# Patient Record
Sex: MALE | Race: BLACK OR AFRICAN AMERICAN | NOT HISPANIC OR LATINO | Employment: FULL TIME | URBAN - METROPOLITAN AREA
[De-identification: names, ages, dates, MRNs, and addresses within clinical notes are randomized per-mention and may not be internally consistent; named-entity substitution may affect disease eponyms.]

---

## 2015-03-19 LAB — CRC RECOMMENDATION EXT: NORMAL

## 2022-05-13 LAB
CHOLEST SERPL-MSCNC: 182 MG/DL (ref 0–200)
HDLC SERPL-MCNC: 64 MG/DL
LDLC SERPL CALC-MCNC: 96 MG/DL
PSA: 3.39
TRIGL SERPL-MCNC: 74 MG/DL

## 2023-07-06 ENCOUNTER — OFFICE VISIT (OUTPATIENT)
Dept: PRIMARY CARE CLINIC | Facility: CLINIC | Age: 63
End: 2023-07-06
Payer: COMMERCIAL

## 2023-07-06 VITALS
HEART RATE: 77 BPM | DIASTOLIC BLOOD PRESSURE: 70 MMHG | BODY MASS INDEX: 23.91 KG/M2 | SYSTOLIC BLOOD PRESSURE: 120 MMHG | WEIGHT: 167 LBS | HEIGHT: 70 IN

## 2023-07-06 DIAGNOSIS — Z11.3 SCREEN FOR STD (SEXUALLY TRANSMITTED DISEASE): ICD-10-CM

## 2023-07-06 DIAGNOSIS — Z12.5 SPECIAL SCREENING FOR MALIGNANT NEOPLASM OF PROSTATE: Primary | ICD-10-CM

## 2023-07-06 DIAGNOSIS — I10 ESSENTIAL HYPERTENSION, BENIGN: ICD-10-CM

## 2023-07-06 DIAGNOSIS — Z00.00 PREVENTATIVE HEALTH CARE: ICD-10-CM

## 2023-07-06 DIAGNOSIS — E78.2 MIXED HYPERLIPIDEMIA: ICD-10-CM

## 2023-07-06 DIAGNOSIS — M1A.0710 IDIOPATHIC CHRONIC GOUT OF RIGHT FOOT WITHOUT TOPHUS: ICD-10-CM

## 2023-07-06 DIAGNOSIS — Z11.59 ENCOUNTER FOR HEPATITIS C SCREENING TEST FOR LOW RISK PATIENT: ICD-10-CM

## 2023-07-06 PROCEDURE — 3074F SYST BP LT 130 MM HG: CPT | Mod: CPTII,S$GLB,, | Performed by: INTERNAL MEDICINE

## 2023-07-06 PROCEDURE — 3078F PR MOST RECENT DIASTOLIC BLOOD PRESSURE < 80 MM HG: ICD-10-PCS | Mod: CPTII,S$GLB,, | Performed by: INTERNAL MEDICINE

## 2023-07-06 PROCEDURE — 4010F ACE/ARB THERAPY RXD/TAKEN: CPT | Mod: CPTII,S$GLB,, | Performed by: INTERNAL MEDICINE

## 2023-07-06 PROCEDURE — 1160F PR REVIEW ALL MEDS BY PRESCRIBER/CLIN PHARMACIST DOCUMENTED: ICD-10-PCS | Mod: CPTII,S$GLB,, | Performed by: INTERNAL MEDICINE

## 2023-07-06 PROCEDURE — 3078F DIAST BP <80 MM HG: CPT | Mod: CPTII,S$GLB,, | Performed by: INTERNAL MEDICINE

## 2023-07-06 PROCEDURE — 1159F PR MEDICATION LIST DOCUMENTED IN MEDICAL RECORD: ICD-10-PCS | Mod: CPTII,S$GLB,, | Performed by: INTERNAL MEDICINE

## 2023-07-06 PROCEDURE — 3008F BODY MASS INDEX DOCD: CPT | Mod: CPTII,S$GLB,, | Performed by: INTERNAL MEDICINE

## 2023-07-06 PROCEDURE — 99214 PR OFFICE/OUTPT VISIT, EST, LEVL IV, 30-39 MIN: ICD-10-PCS | Mod: S$GLB,,, | Performed by: INTERNAL MEDICINE

## 2023-07-06 PROCEDURE — 4010F PR ACE/ARB THEARPY RXD/TAKEN: ICD-10-PCS | Mod: CPTII,S$GLB,, | Performed by: INTERNAL MEDICINE

## 2023-07-06 PROCEDURE — 1159F MED LIST DOCD IN RCRD: CPT | Mod: CPTII,S$GLB,, | Performed by: INTERNAL MEDICINE

## 2023-07-06 PROCEDURE — 3074F PR MOST RECENT SYSTOLIC BLOOD PRESSURE < 130 MM HG: ICD-10-PCS | Mod: CPTII,S$GLB,, | Performed by: INTERNAL MEDICINE

## 2023-07-06 PROCEDURE — 99214 OFFICE O/P EST MOD 30 MIN: CPT | Mod: S$GLB,,, | Performed by: INTERNAL MEDICINE

## 2023-07-06 PROCEDURE — 1160F RVW MEDS BY RX/DR IN RCRD: CPT | Mod: CPTII,S$GLB,, | Performed by: INTERNAL MEDICINE

## 2023-07-06 PROCEDURE — 3008F PR BODY MASS INDEX (BMI) DOCUMENTED: ICD-10-PCS | Mod: CPTII,S$GLB,, | Performed by: INTERNAL MEDICINE

## 2023-07-06 RX ORDER — MELOXICAM 15 MG/1
15 TABLET ORAL DAILY
Qty: 30 TABLET | Refills: 2 | Status: SHIPPED | OUTPATIENT
Start: 2023-07-06 | End: 2023-10-04

## 2023-07-06 RX ORDER — MELOXICAM 15 MG/1
15 TABLET ORAL DAILY PRN
COMMUNITY
Start: 2023-05-11 | End: 2023-07-06 | Stop reason: SDUPTHER

## 2023-07-06 RX ORDER — ALLOPURINOL 100 MG/1
100 TABLET ORAL DAILY
COMMUNITY
Start: 2023-03-02 | End: 2023-07-06

## 2023-07-06 RX ORDER — ATORVASTATIN CALCIUM 20 MG/1
20 TABLET, FILM COATED ORAL DAILY
COMMUNITY
Start: 2023-03-15 | End: 2024-03-05 | Stop reason: SDUPTHER

## 2023-07-06 RX ORDER — BENAZEPRIL HYDROCHLORIDE 20 MG/1
20 TABLET ORAL DAILY
COMMUNITY
Start: 2023-05-08 | End: 2024-03-05 | Stop reason: SDUPTHER

## 2023-07-06 NOTE — PROGRESS NOTES
Subjective:       Patient ID: Sandro Malone is a 62 y.o. male.    Chief Complaint: Follow-up      Patient is established already .......... presents today for a f/u visit , to discuss labs results and for management of the chronic conditions.        Follow-up  Pertinent negatives include no fever.   Hypertension  This is a chronic problem. The current episode started more than 1 year ago. The problem has been waxing and waning since onset. The problem is uncontrolled. There are no associated agents to hypertension. Risk factors for coronary artery disease include dyslipidemia, family history, obesity and sedentary lifestyle. Past treatments include calcium channel blockers and ACE inhibitors. The current treatment provides significant improvement. Compliance problems include psychosocial issues, diet and exercise.    Review of Systems   Constitutional:  Negative for activity change, appetite change and fever.   Respiratory: Negative.     Cardiovascular: Negative.    Gastrointestinal: Negative.    Musculoskeletal: Negative.        Objective:      Physical Exam  Vitals and nursing note reviewed.   Constitutional:       Appearance: Normal appearance. He is obese.   Cardiovascular:      Rate and Rhythm: Normal rate and regular rhythm.      Pulses: Normal pulses.      Heart sounds: Normal heart sounds. No murmur heard.    No friction rub. No gallop.   Pulmonary:      Effort: Pulmonary effort is normal.      Breath sounds: Normal breath sounds. No wheezing.   Abdominal:      General: Abdomen is flat. Bowel sounds are normal.      Palpations: Abdomen is soft.   Musculoskeletal:         General: Normal range of motion.   Skin:     General: Skin is warm and dry.   Neurological:      General: No focal deficit present.      Mental Status: He is alert and oriented to person, place, and time.   Psychiatric:         Mood and Affect: Mood normal.       Assessment:       1. Special screening for malignant neoplasm of  prostate  -     Prostate Specific Antigen, Diagnostic; Future; Expected date: 07/06/2023    2. Mixed hyperlipidemia  Overview:  Usually controlled     Assessment & Plan:  Recheck    Orders:  -     Lipid Panel; Future; Expected date: 07/06/2023    3. Essential hypertension, benign  -     Comprehensive Metabolic Panel; Future; Expected date: 07/06/2023  -     Microalbumin/Creatinine Ratio, Urine; Future; Expected date: 07/06/2023    4. Encounter for hepatitis C screening test for low risk patient  -     Hepatitis C Antibody; Future; Expected date: 07/06/2023    5. Screen for STD (sexually transmitted disease)  -     HIV 1/2 Ag/Ab (4th Gen); Future; Expected date: 01/06/2024    6. Idiopathic chronic gout of right foot without tophus  Overview:  Stable    Assessment & Plan:  Hold allopurinol and monitor uric acid      Other orders  -     meloxicam (MOBIC) 15 MG tablet; Take 1 tablet (15 mg total) by mouth once daily.  Dispense: 30 tablet; Refill: 2             Plan:       1. Special screening for malignant neoplasm of prostate  -     Prostate Specific Antigen, Diagnostic; Future; Expected date: 07/06/2023    2. Mixed hyperlipidemia  Overview:  Usually controlled     Assessment & Plan:  Recheck    Orders:  -     Lipid Panel; Future; Expected date: 07/06/2023    3. Essential hypertension, benign  -     Comprehensive Metabolic Panel; Future; Expected date: 07/06/2023  -     Microalbumin/Creatinine Ratio, Urine; Future; Expected date: 07/06/2023    4. Encounter for hepatitis C screening test for low risk patient  -     Hepatitis C Antibody; Future; Expected date: 07/06/2023    5. Screen for STD (sexually transmitted disease)  -     HIV 1/2 Ag/Ab (4th Gen); Future; Expected date: 01/06/2024    6. Idiopathic chronic gout of right foot without tophus  Overview:  Stable    Assessment & Plan:  Hold allopurinol and monitor uric acid      Other orders  -     meloxicam (MOBIC) 15 MG tablet; Take 1 tablet (15 mg total) by mouth once  daily.  Dispense: 30 tablet; Refill: 2

## 2023-07-07 ENCOUNTER — PATIENT OUTREACH (OUTPATIENT)
Dept: ADMINISTRATIVE | Facility: HOSPITAL | Age: 63
End: 2023-07-07
Payer: COMMERCIAL

## 2023-07-07 NOTE — PROGRESS NOTES
Population Health Chart Review & Patient Outreach Details:     Reason for Outreach Encounter:     [x]  Non-Compliant Report   []  Payor Report (Hum[employer], PHN, BCBS, MSSP, MCIP, UHC, etc.)   []  Pre-Visit Chart Review     Updates Requested / Reviewed:     []  Care Everywhere    []     []  External Sources ([facility], [facility], DIS, etc.)   [x]  Care Team Updated    Patient Outreach Method:    []  Telephone Outreach Completed   [] Successful   [] Left Voicemail   [] Unable to Contact (wrong number, no voicemail)  []  [employer] Portal Outreach Sent  []  Letter Outreach Mailed  []  Fax Sent for External Records  [x]  External Records Upload    Health Maintenance Topics Addressed and Outreach Outcomes / Actions Taken:        []      Breast Cancer Screening []  Mammo Scheduled      []  External Records Requested     []  Added Reminder to Complete to Upcoming Primary Care Appt Notes     []  Patient Declined     []  Patient Will Call Back to Schedule     []  Patient Will Schedule with External Provider / Order Routed if Applicable             []       Cervical Cancer Screening []  Pap Scheduled      []  External Records Requested     []  Added Reminder to Complete to Upcoming Primary Care Appt Notes     []  Patient Declined     []  Patient Will Call Back to Schedule     []  Patient Will Schedule with External Provider               [x]          Colorectal Cancer Screening []  Colonoscopy Case Request or Referral Placed     []  External Records Requested     []  Added Reminder to Complete to Upcoming Primary Care Appt Notes     []  Patient Declined     []  Patient Will Call Back to Schedule     []  Patient Will Schedule with External Provider     []  Fit Kit Mailed (add the SmartPhrase under additional notes)     []  Reminded Patient to Complete Home Test             []      Diabetic Eye Exam []  Eye Camera Scheduled or Optometry Referral Placed     []  External Records Requested     []  Added Reminder to Complete to  Upcoming Primary Care Appt Notes     []  Patient Declined     []  Patient Will Call Back to Schedule     []  Patient Will Schedule with External Provider             []      Blood Pressure Control []  Primary Care Follow Up Visit Scheduled     []  Remote Blood Pressure Reading Captured     []  Added Reminder to Complete to Upcoming Primary Care Appt Notes     []  Patient Declined     []  Patient Will Call Back / Patient Will Send Portal Message with Reading     []  Patient Will Call Back to Schedule Provider Visit             []       HbA1c & Other Labs []  Lab Appt Scheduled for Due Labs     []  Primary Care Follow Up Visit Scheduled      []  Reminded Patient to Complete Home Test     []  Added Reminder to Complete to Upcoming Primary Care Appt Notes     []  Patient Declined     []  Patient Will Call Back to Schedule     []  Patient Will Schedule with External Provider / Order Routed if Applicable           []    Schedule Primary Care Appt []  Primary Care Appt Scheduled     []  Patient Declined     []  Patient Will Call Back to Schedule     []  Pt Established with External Provider & Updated Care Team             []      Medication Adherence []  Primary Care Appointment Scheduled     []  Added Reminder to Upcoming Primary Care Appt Notes     []  Patient Reminded to  Prescription     []  Patient Declined, Provider Notified if Needed     []  Sent Provider Message to Review and/or Add Exclusion to Problem List             []      Osteoporosis Screening []  DXA Appointment Scheduled     []  External Records Requested     []  Added Reminder to Complete to Upcoming Primary Care Appt Notes     []  Patient Declined     []  Patient Will Call Back to Schedule     []  Patient Will Schedule with External Provider / Order Routed if Applicable     Additional Care Coordinator Notes:     Uploaded 2015 colonoscopy & 05/2022 labs    Further Action Needed If Patient Returns Outreach:

## 2023-07-28 ENCOUNTER — TELEPHONE (OUTPATIENT)
Dept: FAMILY MEDICINE | Facility: CLINIC | Age: 63
End: 2023-07-28
Payer: COMMERCIAL

## 2023-07-28 NOTE — TELEPHONE ENCOUNTER
----- Message from Roseanne Ryan sent at 7/28/2023 10:45 AM CDT -----  Contact: Patient  Type:  Needs Medical Advice    Who Called: Patient    Would the patient rather a call back or a response via [facility]? Fax    Best Call Back Number: SEE BELOW    Additional Information: patient was instructed to get labs done but no lab orders were faxed in.  [facility] lab Fax number [phone]

## 2023-08-07 ENCOUNTER — OFFICE VISIT (OUTPATIENT)
Dept: PRIMARY CARE CLINIC | Facility: CLINIC | Age: 63
End: 2023-08-07
Payer: COMMERCIAL

## 2023-08-07 VITALS
RESPIRATION RATE: 18 BRPM | OXYGEN SATURATION: 96 % | SYSTOLIC BLOOD PRESSURE: 134 MMHG | DIASTOLIC BLOOD PRESSURE: 74 MMHG | HEART RATE: 84 BPM | HEIGHT: 70 IN | TEMPERATURE: 98 F | BODY MASS INDEX: 23.91 KG/M2 | WEIGHT: 167 LBS
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
## 2023-08-07 DIAGNOSIS — T14.8XXA ABRASION: ICD-10-CM

## 2023-08-07 DIAGNOSIS — Z00.01 ENCOUNTER FOR GENERAL ADULT MEDICAL EXAMINATION WITH ABNORMAL FINDINGS: ICD-10-CM

## 2023-08-07 PROCEDURE — 3078F DIAST BP <80 MM HG: CPT | Mod: CPTII,S$GLB,, | Performed by: INTERNAL MEDICINE

## 2023-08-07 PROCEDURE — 99396 PREV VISIT EST AGE 40-64: CPT | Mod: S$GLB,,, | Performed by: INTERNAL MEDICINE

## 2023-08-07 PROCEDURE — 3075F SYST BP GE 130 - 139MM HG: CPT | Mod: CPTII,S$GLB,, | Performed by: INTERNAL MEDICINE

## 2023-08-07 PROCEDURE — 3075F PR MOST RECENT SYSTOLIC BLOOD PRESS GE 130-139MM HG: ICD-10-PCS | Mod: CPTII,S$GLB,, | Performed by: INTERNAL MEDICINE

## 2023-08-07 PROCEDURE — 3008F PR BODY MASS INDEX (BMI) DOCUMENTED: ICD-10-PCS | Mod: CPTII,S$GLB,, | Performed by: INTERNAL MEDICINE

## 2023-08-07 PROCEDURE — 1160F PR REVIEW ALL MEDS BY PRESCRIBER/CLIN PHARMACIST DOCUMENTED: ICD-10-PCS | Mod: CPTII,S$GLB,, | Performed by: INTERNAL MEDICINE

## 2023-08-07 PROCEDURE — 1160F RVW MEDS BY RX/DR IN RCRD: CPT | Mod: CPTII,S$GLB,, | Performed by: INTERNAL MEDICINE

## 2023-08-07 PROCEDURE — 4010F PR ACE/ARB THEARPY RXD/TAKEN: ICD-10-PCS | Mod: CPTII,S$GLB,, | Performed by: INTERNAL MEDICINE

## 2023-08-07 PROCEDURE — 1159F MED LIST DOCD IN RCRD: CPT | Mod: CPTII,S$GLB,, | Performed by: INTERNAL MEDICINE

## 2023-08-07 PROCEDURE — 99213 PR OFFICE/OUTPT VISIT, EST, LEVL III, 20-29 MIN: ICD-10-PCS | Mod: 25,S$GLB,, | Performed by: INTERNAL MEDICINE

## 2023-08-07 PROCEDURE — 3008F BODY MASS INDEX DOCD: CPT | Mod: CPTII,S$GLB,, | Performed by: INTERNAL MEDICINE

## 2023-08-07 PROCEDURE — 1159F PR MEDICATION LIST DOCUMENTED IN MEDICAL RECORD: ICD-10-PCS | Mod: CPTII,S$GLB,, | Performed by: INTERNAL MEDICINE

## 2023-08-07 PROCEDURE — 99213 OFFICE O/P EST LOW 20 MIN: CPT | Mod: 25,S$GLB,, | Performed by: INTERNAL MEDICINE

## 2023-08-07 PROCEDURE — 3078F PR MOST RECENT DIASTOLIC BLOOD PRESSURE < 80 MM HG: ICD-10-PCS | Mod: CPTII,S$GLB,, | Performed by: INTERNAL MEDICINE

## 2023-08-07 PROCEDURE — 99396 PR PREVENTIVE VISIT,EST,40-64: ICD-10-PCS | Mod: S$GLB,,, | Performed by: INTERNAL MEDICINE

## 2023-08-07 PROCEDURE — 4010F ACE/ARB THERAPY RXD/TAKEN: CPT | Mod: CPTII,S$GLB,, | Performed by: INTERNAL MEDICINE

## 2023-08-07 RX ORDER — ALLOPURINOL 100 MG/1
1 TABLET ORAL DAILY
COMMUNITY
End: 2024-03-05

## 2023-08-07 RX ORDER — HYDROCODONE BITARTRATE AND ACETAMINOPHEN 5; 325 MG/1; MG/1
2 TABLET ORAL
COMMUNITY
Start: 2023-08-06 | End: 2023-08-16

## 2023-08-07 RX ORDER — ALBUTEROL SULFATE 90 UG/1
AEROSOL, METERED RESPIRATORY (INHALATION)
COMMUNITY

## 2023-08-07 RX ORDER — KETOROLAC TROMETHAMINE 5 MG/ML
1 SOLUTION OPHTHALMIC 4 TIMES DAILY
COMMUNITY
Start: 2023-07-06 | End: 2023-08-24 | Stop reason: ALTCHOICE

## 2023-08-07 RX ORDER — IBUPROFEN 600 MG/1
600 TABLET ORAL 3 TIMES DAILY
Qty: 45 TABLET | Refills: 0 | Status: SHIPPED | OUTPATIENT
Start: 2023-08-07 | End: 2023-08-22

## 2023-08-07 RX ORDER — ERGOCALCIFEROL 1.25 MG/1
1 CAPSULE ORAL
COMMUNITY

## 2023-08-07 RX ORDER — AMLODIPINE BESYLATE 5 MG/1
5 TABLET ORAL
COMMUNITY
Start: 2023-07-31

## 2023-08-07 RX ORDER — SILVER SULFADIAZINE 10 G/1000G
CREAM TOPICAL 2 TIMES DAILY
Qty: 50 G | Refills: 3 | Status: SHIPPED | OUTPATIENT
Start: 2023-08-07 | End: 2023-09-04

## 2023-08-07 RX ORDER — SULFAMETHOXAZOLE AND TRIMETHOPRIM 800; 160 MG/1; MG/1
1 TABLET ORAL 2 TIMES DAILY
COMMUNITY
Start: 2023-08-06 | End: 2024-03-05
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
## 2023-08-07 NOTE — ASSESSMENT & PLAN NOTE
We applied mupirocin ointment to the superficial abrasion of his left forearm and covered that with nonadhesive pad and gauze bandages  Patient was given instructions for similar wound care  Accordingly prescription for Silvadene cream to be applied to his wounds once or twice a day  I also called him ibuprofen for pain
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
## 2023-08-17 ENCOUNTER — OFFICE VISIT (OUTPATIENT)
Dept: PRIMARY CARE CLINIC | Facility: CLINIC | Age: 63
End: 2023-08-17
Payer: COMMERCIAL

## 2023-08-17 VITALS
OXYGEN SATURATION: 98 % | SYSTOLIC BLOOD PRESSURE: 110 MMHG | HEART RATE: 91 BPM | WEIGHT: 166 LBS | DIASTOLIC BLOOD PRESSURE: 64 MMHG | BODY MASS INDEX: 23.77 KG/M2 | HEIGHT: 70 IN

## 2023-08-17 DIAGNOSIS — L03.114 CELLULITIS OF LEFT UPPER EXTREMITY: ICD-10-CM

## 2023-08-17 DIAGNOSIS — L02.419 CELLULITIS AND ABSCESS OF UPPER EXTREMITY: Primary | ICD-10-CM

## 2023-08-17 DIAGNOSIS — L03.119 CELLULITIS AND ABSCESS OF UPPER EXTREMITY: Primary | ICD-10-CM

## 2023-08-17 PROBLEM — L03.90 CELLULITIS: Status: ACTIVE | Noted: 2023-08-17

## 2023-08-17 PROCEDURE — 3008F PR BODY MASS INDEX (BMI) DOCUMENTED: ICD-10-PCS | Mod: CPTII,S$GLB,, | Performed by: INTERNAL MEDICINE

## 2023-08-17 PROCEDURE — 96372 THER/PROPH/DIAG INJ SC/IM: CPT | Mod: S$GLB,,, | Performed by: INTERNAL MEDICINE

## 2023-08-17 PROCEDURE — 1159F PR MEDICATION LIST DOCUMENTED IN MEDICAL RECORD: ICD-10-PCS | Mod: CPTII,S$GLB,, | Performed by: INTERNAL MEDICINE

## 2023-08-17 PROCEDURE — 3078F PR MOST RECENT DIASTOLIC BLOOD PRESSURE < 80 MM HG: ICD-10-PCS | Mod: CPTII,S$GLB,, | Performed by: INTERNAL MEDICINE

## 2023-08-17 PROCEDURE — 1160F RVW MEDS BY RX/DR IN RCRD: CPT | Mod: CPTII,S$GLB,, | Performed by: INTERNAL MEDICINE

## 2023-08-17 PROCEDURE — 96372 PR INJECTION,THERAP/PROPH/DIAG2ST, IM OR SUBCUT: ICD-10-PCS | Mod: S$GLB,,, | Performed by: INTERNAL MEDICINE

## 2023-08-17 PROCEDURE — 3074F PR MOST RECENT SYSTOLIC BLOOD PRESSURE < 130 MM HG: ICD-10-PCS | Mod: CPTII,S$GLB,, | Performed by: INTERNAL MEDICINE

## 2023-08-17 PROCEDURE — 1159F MED LIST DOCD IN RCRD: CPT | Mod: CPTII,S$GLB,, | Performed by: INTERNAL MEDICINE

## 2023-08-17 PROCEDURE — 3074F SYST BP LT 130 MM HG: CPT | Mod: CPTII,S$GLB,, | Performed by: INTERNAL MEDICINE

## 2023-08-17 PROCEDURE — 3078F DIAST BP <80 MM HG: CPT | Mod: CPTII,S$GLB,, | Performed by: INTERNAL MEDICINE

## 2023-08-17 PROCEDURE — 99215 PR OFFICE/OUTPT VISIT, EST, LEVL V, 40-54 MIN: ICD-10-PCS | Mod: 25,S$GLB,, | Performed by: INTERNAL MEDICINE

## 2023-08-17 PROCEDURE — 4010F ACE/ARB THERAPY RXD/TAKEN: CPT | Mod: CPTII,S$GLB,, | Performed by: INTERNAL MEDICINE

## 2023-08-17 PROCEDURE — 16025 PR DRESS/DEBRID MED BURN 2 ANESTH: ICD-10-PCS | Mod: 59,S$GLB,, | Performed by: INTERNAL MEDICINE

## 2023-08-17 PROCEDURE — 1160F PR REVIEW ALL MEDS BY PRESCRIBER/CLIN PHARMACIST DOCUMENTED: ICD-10-PCS | Mod: CPTII,S$GLB,, | Performed by: INTERNAL MEDICINE

## 2023-08-17 PROCEDURE — 16025 DRESS/DEBRID P-THICK BURN M: CPT | Mod: 59,S$GLB,, | Performed by: INTERNAL MEDICINE

## 2023-08-17 PROCEDURE — 3008F BODY MASS INDEX DOCD: CPT | Mod: CPTII,S$GLB,, | Performed by: INTERNAL MEDICINE

## 2023-08-17 PROCEDURE — 4010F PR ACE/ARB THEARPY RXD/TAKEN: ICD-10-PCS | Mod: CPTII,S$GLB,, | Performed by: INTERNAL MEDICINE

## 2023-08-17 PROCEDURE — 99215 OFFICE O/P EST HI 40 MIN: CPT | Mod: 25,S$GLB,, | Performed by: INTERNAL MEDICINE

## 2023-08-17 RX ORDER — CEFTRIAXONE 500 MG/1
500 INJECTION, POWDER, FOR SOLUTION INTRAMUSCULAR; INTRAVENOUS
Status: COMPLETED | OUTPATIENT
Start: 2023-08-17 | End: 2023-08-17

## 2023-08-17 RX ORDER — HYDROCODONE BITARTRATE AND ACETAMINOPHEN 5; 325 MG/1; MG/1
1 TABLET ORAL EVERY 6 HOURS PRN
Qty: 28 TABLET | Refills: 0 | Status: SHIPPED | OUTPATIENT
Start: 2023-08-17 | End: 2023-08-24

## 2023-08-17 RX ADMIN — CEFTRIAXONE 500 MG: 500 INJECTION, POWDER, FOR SOLUTION INTRAMUSCULAR; INTRAVENOUS at 04:08
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
## 2023-08-17 NOTE — ASSESSMENT & PLAN NOTE
I cleaned his superficial abrasions and superficial wounds of his left and right forearms with wound cleansing solution  I removed about 5 sutures from his right forearm  I covered his wounds with bacitracin ointment  I addressed his arms with nonadherent pads and wrapped them with gauze bandages  I gave the patient 1 g of Rocephin IM  He was instructed on wound care

## 2023-08-24 ENCOUNTER — OFFICE VISIT (OUTPATIENT)
Dept: PRIMARY CARE CLINIC | Facility: CLINIC | Age: 63
End: 2023-08-24
Payer: COMMERCIAL

## 2023-08-24 VITALS
SYSTOLIC BLOOD PRESSURE: 124 MMHG | HEIGHT: 70 IN | DIASTOLIC BLOOD PRESSURE: 76 MMHG | BODY MASS INDEX: 23.77 KG/M2 | HEART RATE: 94 BPM | OXYGEN SATURATION: 98 % | WEIGHT: 166 LBS

## 2023-08-24 DIAGNOSIS — L03.119 CELLULITIS OF UPPER EXTREMITY, UNSPECIFIED LATERALITY: ICD-10-CM

## 2023-08-24 DIAGNOSIS — M25.551 PAIN OF RIGHT HIP: ICD-10-CM

## 2023-08-24 PROCEDURE — 1159F MED LIST DOCD IN RCRD: CPT | Mod: CPTII,S$GLB,, | Performed by: INTERNAL MEDICINE

## 2023-08-24 PROCEDURE — 3074F SYST BP LT 130 MM HG: CPT | Mod: CPTII,S$GLB,, | Performed by: INTERNAL MEDICINE

## 2023-08-24 PROCEDURE — 3008F BODY MASS INDEX DOCD: CPT | Mod: CPTII,S$GLB,, | Performed by: INTERNAL MEDICINE

## 2023-08-24 PROCEDURE — 4010F ACE/ARB THERAPY RXD/TAKEN: CPT | Mod: CPTII,S$GLB,, | Performed by: INTERNAL MEDICINE

## 2023-08-24 PROCEDURE — 99213 OFFICE O/P EST LOW 20 MIN: CPT | Mod: S$GLB,,, | Performed by: INTERNAL MEDICINE

## 2023-08-24 PROCEDURE — 1160F RVW MEDS BY RX/DR IN RCRD: CPT | Mod: CPTII,S$GLB,, | Performed by: INTERNAL MEDICINE

## 2023-08-24 PROCEDURE — 3074F PR MOST RECENT SYSTOLIC BLOOD PRESSURE < 130 MM HG: ICD-10-PCS | Mod: CPTII,S$GLB,, | Performed by: INTERNAL MEDICINE

## 2023-08-24 PROCEDURE — 1160F PR REVIEW ALL MEDS BY PRESCRIBER/CLIN PHARMACIST DOCUMENTED: ICD-10-PCS | Mod: CPTII,S$GLB,, | Performed by: INTERNAL MEDICINE

## 2023-08-24 PROCEDURE — 3078F DIAST BP <80 MM HG: CPT | Mod: CPTII,S$GLB,, | Performed by: INTERNAL MEDICINE

## 2023-08-24 PROCEDURE — 3078F PR MOST RECENT DIASTOLIC BLOOD PRESSURE < 80 MM HG: ICD-10-PCS | Mod: CPTII,S$GLB,, | Performed by: INTERNAL MEDICINE

## 2023-08-24 PROCEDURE — 4010F PR ACE/ARB THEARPY RXD/TAKEN: ICD-10-PCS | Mod: CPTII,S$GLB,, | Performed by: INTERNAL MEDICINE

## 2023-08-24 PROCEDURE — 99213 PR OFFICE/OUTPT VISIT, EST, LEVL III, 20-29 MIN: ICD-10-PCS | Mod: S$GLB,,, | Performed by: INTERNAL MEDICINE

## 2023-08-24 PROCEDURE — 3008F PR BODY MASS INDEX (BMI) DOCUMENTED: ICD-10-PCS | Mod: CPTII,S$GLB,, | Performed by: INTERNAL MEDICINE

## 2023-08-24 PROCEDURE — 1159F PR MEDICATION LIST DOCUMENTED IN MEDICAL RECORD: ICD-10-PCS | Mod: CPTII,S$GLB,, | Performed by: INTERNAL MEDICINE

## 2023-08-24 RX ORDER — IBUPROFEN 800 MG/1
800 TABLET ORAL 3 TIMES DAILY
Qty: 90 TABLET | Refills: 0 | Status: SHIPPED | OUTPATIENT
Start: 2023-08-24 | End: 2023-09-23

## 2023-08-24 RX ORDER — CYCLOBENZAPRINE HCL 10 MG
10 TABLET ORAL 3 TIMES DAILY PRN
Qty: 30 TABLET | Refills: 0 | Status: SHIPPED | OUTPATIENT
Start: 2023-08-24 | End: 2023-09-23
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
## 2023-08-29 ENCOUNTER — OFFICE VISIT (OUTPATIENT)
Dept: PRIMARY CARE CLINIC | Facility: CLINIC | Age: 63
End: 2023-08-29
Payer: COMMERCIAL

## 2023-08-29 DIAGNOSIS — M25.551 PAIN OF RIGHT HIP: ICD-10-CM

## 2023-08-29 PROCEDURE — 1160F PR REVIEW ALL MEDS BY PRESCRIBER/CLIN PHARMACIST DOCUMENTED: ICD-10-PCS | Mod: CPTII,95,, | Performed by: INTERNAL MEDICINE

## 2023-08-29 PROCEDURE — 99212 OFFICE O/P EST SF 10 MIN: CPT | Mod: 95,,, | Performed by: INTERNAL MEDICINE

## 2023-08-29 PROCEDURE — 1159F PR MEDICATION LIST DOCUMENTED IN MEDICAL RECORD: ICD-10-PCS | Mod: CPTII,95,, | Performed by: INTERNAL MEDICINE

## 2023-08-29 PROCEDURE — 4010F PR ACE/ARB THEARPY RXD/TAKEN: ICD-10-PCS | Mod: CPTII,95,, | Performed by: INTERNAL MEDICINE

## 2023-08-29 PROCEDURE — 4010F ACE/ARB THERAPY RXD/TAKEN: CPT | Mod: CPTII,95,, | Performed by: INTERNAL MEDICINE

## 2023-08-29 PROCEDURE — 1160F RVW MEDS BY RX/DR IN RCRD: CPT | Mod: CPTII,95,, | Performed by: INTERNAL MEDICINE

## 2023-08-29 PROCEDURE — 1159F MED LIST DOCD IN RCRD: CPT | Mod: CPTII,95,, | Performed by: INTERNAL MEDICINE

## 2023-08-29 PROCEDURE — 99212 PR OFFICE/OUTPT VISIT, EST, LEVL II, 10-19 MIN: ICD-10-PCS | Mod: 95,,, | Performed by: INTERNAL MEDICINE
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
## 2023-08-29 NOTE — ASSESSMENT & PLAN NOTE
The patient location is: home  The chief complaint leading to consultation is: **R leg pain, numbness    Visit type: audiovisual    Face to Face time with patient: 10 min  15 minutes of total time spent on the encounter, which includes face to face time and non-face to face time preparing to see the patient (eg, review of tests), Obtaining and/or reviewing separately obtained history, Documenting clinical information in the electronic or other health record, Independently interpreting results (not separately reported) and communicating results to the patient/family/caregiver, or Care coordination (not separately reported).         Each patient to whom he or she provides medical services by telemedicine is:  (1) informed of the relationship between the physician and patient and the respective role of any other health care provider with respect to management of the patient; and (2) notified that he or she may decline to receive medical services by telemedicine and may withdraw from such care at any time.    Notes: Requesting also ext of time off work by 1 more week
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
## 2023-10-09 PROBLEM — Z00.00 PREVENTATIVE HEALTH CARE: Status: RESOLVED | Noted: 2023-07-06 | Resolved: 2023-10-09

## 2023-11-06 PROBLEM — Z00.01 ENCOUNTER FOR GENERAL ADULT MEDICAL EXAMINATION WITH ABNORMAL FINDINGS: Status: RESOLVED | Noted: 2023-08-07 | Resolved: 2023-11-06

## 2023-12-07 ENCOUNTER — E-VISIT (OUTPATIENT)
Dept: FAMILY MEDICINE | Facility: CLINIC | Age: 63
End: 2023-12-07
Payer: COMMERCIAL

## 2023-12-07 DIAGNOSIS — G89.29 CHRONIC MIDLINE THORACIC BACK PAIN: Primary | ICD-10-CM

## 2023-12-07 DIAGNOSIS — M54.6 CHRONIC MIDLINE THORACIC BACK PAIN: Primary | ICD-10-CM

## 2023-12-07 PROCEDURE — 99423 OL DIG E/M SVC 21+ MIN: CPT | Mod: ,,, | Performed by: INTERNAL MEDICINE

## 2023-12-07 PROCEDURE — 99423 PR E&M, ONLINE DIGIT, EST, < 7 DAYS,  21+ MINS: ICD-10-PCS | Mod: ,,, | Performed by: INTERNAL MEDICINE
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
## 2023-12-08 RX ORDER — GABAPENTIN 100 MG/1
100 CAPSULE ORAL 3 TIMES DAILY
Qty: 90 CAPSULE | Refills: 0 | Status: SHIPPED | OUTPATIENT
Start: 2023-12-08 | End: 2024-01-05

## 2024-01-05 RX ORDER — GABAPENTIN 100 MG/1
100 CAPSULE ORAL 3 TIMES DAILY
Qty: 90 CAPSULE | Refills: 0 | Status: SHIPPED | OUTPATIENT
Start: 2024-01-05 | End: 2024-02-10

## 2024-02-05 ENCOUNTER — OFFICE VISIT (OUTPATIENT)
Dept: FAMILY MEDICINE | Facility: CLINIC | Age: 64
End: 2024-02-05
Payer: COMMERCIAL

## 2024-02-05 VITALS
HEART RATE: 77 BPM | BODY MASS INDEX: 24.77 KG/M2 | DIASTOLIC BLOOD PRESSURE: 90 MMHG | OXYGEN SATURATION: 97 % | WEIGHT: 173 LBS | SYSTOLIC BLOOD PRESSURE: 148 MMHG | HEIGHT: 70 IN

## 2024-02-05 DIAGNOSIS — M54.6 CHRONIC THORACIC BACK PAIN, UNSPECIFIED BACK PAIN LATERALITY: Primary | ICD-10-CM

## 2024-02-05 DIAGNOSIS — G89.29 CHRONIC THORACIC BACK PAIN, UNSPECIFIED BACK PAIN LATERALITY: Primary | ICD-10-CM

## 2024-02-05 PROCEDURE — 1160F RVW MEDS BY RX/DR IN RCRD: CPT | Mod: CPTII,S$GLB,, | Performed by: STUDENT IN AN ORGANIZED HEALTH CARE EDUCATION/TRAINING PROGRAM

## 2024-02-05 PROCEDURE — 3080F DIAST BP >= 90 MM HG: CPT | Mod: CPTII,S$GLB,, | Performed by: STUDENT IN AN ORGANIZED HEALTH CARE EDUCATION/TRAINING PROGRAM

## 2024-02-05 PROCEDURE — 1159F MED LIST DOCD IN RCRD: CPT | Mod: CPTII,S$GLB,, | Performed by: STUDENT IN AN ORGANIZED HEALTH CARE EDUCATION/TRAINING PROGRAM

## 2024-02-05 PROCEDURE — 3077F SYST BP >= 140 MM HG: CPT | Mod: CPTII,S$GLB,, | Performed by: STUDENT IN AN ORGANIZED HEALTH CARE EDUCATION/TRAINING PROGRAM

## 2024-02-05 PROCEDURE — 99214 OFFICE O/P EST MOD 30 MIN: CPT | Mod: S$GLB,,, | Performed by: STUDENT IN AN ORGANIZED HEALTH CARE EDUCATION/TRAINING PROGRAM

## 2024-02-05 PROCEDURE — 3008F BODY MASS INDEX DOCD: CPT | Mod: CPTII,S$GLB,, | Performed by: STUDENT IN AN ORGANIZED HEALTH CARE EDUCATION/TRAINING PROGRAM

## 2024-02-05 RX ORDER — MELOXICAM 15 MG/1
15 TABLET ORAL DAILY
Qty: 60 TABLET | Refills: 1 | Status: SHIPPED | OUTPATIENT
Start: 2024-02-05 | End: 2024-03-05 | Stop reason: SDUPTHER

## 2024-02-05 RX ORDER — CYCLOBENZAPRINE HCL 10 MG
10 TABLET ORAL 3 TIMES DAILY PRN
Qty: 45 TABLET | Refills: 1 | Status: SHIPPED | OUTPATIENT
Start: 2024-02-05 | End: 2024-02-15
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
## 2024-02-07 ENCOUNTER — HOSPITAL ENCOUNTER (OUTPATIENT)
Dept: RADIOLOGY | Facility: HOSPITAL | Age: 64
Discharge: HOME | End: 2024-02-07
Attending: STUDENT IN AN ORGANIZED HEALTH CARE EDUCATION/TRAINING PROGRAM
Payer: COMMERCIAL

## 2024-02-07 DIAGNOSIS — M54.6 CHRONIC THORACIC BACK PAIN, UNSPECIFIED BACK PAIN LATERALITY: ICD-10-CM

## 2024-02-07 DIAGNOSIS — G89.29 CHRONIC THORACIC BACK PAIN, UNSPECIFIED BACK PAIN LATERALITY: ICD-10-CM

## 2024-02-07 PROCEDURE — 72100 X-RAY EXAM L-S SPINE 2/3 VWS: CPT | Mod: TC

## 2024-02-07 PROCEDURE — 72100 X-RAY EXAM L-S SPINE 2/3 VWS: CPT | Mod: 26,,, | Performed by: RADIOLOGY

## 2024-02-09 ENCOUNTER — LAB VISIT (OUTPATIENT)
Dept: LAB | Facility: CLINIC | Age: 64
End: 2024-02-09
Payer: COMMERCIAL

## 2024-02-09 DIAGNOSIS — M10.00 IDIOPATHIC GOUT, UNSPECIFIED CHRONICITY, UNSPECIFIED SITE: ICD-10-CM

## 2024-02-09 DIAGNOSIS — Z11.3 SCREEN FOR STD (SEXUALLY TRANSMITTED DISEASE): Primary | ICD-10-CM

## 2024-02-09 DIAGNOSIS — Z11.59 ENCOUNTER FOR HEPATITIS C SCREENING TEST FOR LOW RISK PATIENT: ICD-10-CM

## 2024-02-09 DIAGNOSIS — Z11.3 SCREEN FOR STD (SEXUALLY TRANSMITTED DISEASE): ICD-10-CM

## 2024-02-09 DIAGNOSIS — Z12.5 SPECIAL SCREENING FOR MALIGNANT NEOPLASM OF PROSTATE: ICD-10-CM

## 2024-02-09 DIAGNOSIS — I10 ESSENTIAL HYPERTENSION, BENIGN: ICD-10-CM

## 2024-02-09 LAB
ALBUMIN SERPL BCP-MCNC: 4.1 G/DL (ref 3.5–5.2)
ALBUMIN/CREAT UR: 16.1 UG/MG (ref 0–30)
ALP SERPL-CCNC: 81 U/L (ref 55–135)
ALT SERPL W/O P-5'-P-CCNC: 38 U/L (ref 10–44)
ANION GAP SERPL CALC-SCNC: 10 MMOL/L (ref 8–16)
AST SERPL-CCNC: 27 U/L (ref 10–40)
BILIRUB SERPL-MCNC: 0.6 MG/DL (ref 0.1–1)
BUN SERPL-MCNC: 17 MG/DL (ref 8–23)
CALCIUM SERPL-MCNC: 9 MG/DL (ref 8.7–10.5)
CHLORIDE SERPL-SCNC: 105 MMOL/L (ref 95–110)
CO2 SERPL-SCNC: 23 MMOL/L (ref 23–29)
COMPLEXED PSA SERPL-MCNC: 4.3 NG/ML (ref 0–4)
CREAT SERPL-MCNC: 1.1 MG/DL (ref 0.5–1.4)
CREAT UR-MCNC: 112 MG/DL (ref 23–375)
EST. GFR  (NO RACE VARIABLE): >60 ML/MIN/1.73 M^2
GLUCOSE SERPL-MCNC: 100 MG/DL (ref 70–110)
HCV AB SERPL QL IA: NORMAL
HIV 1+2 AB+HIV1 P24 AG SERPL QL IA: NORMAL
MICROALBUMIN UR DL<=1MG/L-MCNC: 18 UG/ML
POTASSIUM SERPL-SCNC: 4.1 MMOL/L (ref 3.5–5.1)
PROT SERPL-MCNC: 7.6 G/DL (ref 6–8.4)
SODIUM SERPL-SCNC: 138 MMOL/L (ref 136–145)
URATE SERPL-MCNC: 7.7 MG/DL (ref 3.4–7)

## 2024-02-09 PROCEDURE — 87389 HIV-1 AG W/HIV-1&-2 AB AG IA: CPT | Performed by: INTERNAL MEDICINE

## 2024-02-09 PROCEDURE — 82043 UR ALBUMIN QUANTITATIVE: CPT | Performed by: INTERNAL MEDICINE

## 2024-02-09 PROCEDURE — 84153 ASSAY OF PSA TOTAL: CPT | Performed by: INTERNAL MEDICINE

## 2024-02-09 PROCEDURE — 84550 ASSAY OF BLOOD/URIC ACID: CPT | Performed by: INTERNAL MEDICINE

## 2024-02-09 PROCEDURE — 80053 COMPREHEN METABOLIC PANEL: CPT | Performed by: INTERNAL MEDICINE

## 2024-02-09 PROCEDURE — 36415 COLL VENOUS BLD VENIPUNCTURE: CPT | Mod: ,,, | Performed by: INTERNAL MEDICINE

## 2024-02-09 PROCEDURE — 86803 HEPATITIS C AB TEST: CPT | Performed by: INTERNAL MEDICINE

## 2024-02-10 RX ORDER — GABAPENTIN 100 MG/1
100 CAPSULE ORAL 3 TIMES DAILY
Qty: 90 CAPSULE | Refills: 0 | Status: SHIPPED | OUTPATIENT
Start: 2024-02-10 | End: 2024-03-22

## 2024-02-26 ENCOUNTER — CLINICAL SUPPORT (OUTPATIENT)
Dept: REHABILITATION | Facility: HOSPITAL | Age: 64
End: 2024-02-26
Payer: COMMERCIAL

## 2024-02-26 DIAGNOSIS — Z74.09 IMPAIRED FUNCTIONAL MOBILITY, BALANCE, GAIT, AND ENDURANCE: Primary | ICD-10-CM

## 2024-02-26 DIAGNOSIS — G89.29 CHRONIC THORACIC BACK PAIN, UNSPECIFIED BACK PAIN LATERALITY: ICD-10-CM

## 2024-02-26 DIAGNOSIS — M54.6 CHRONIC THORACIC BACK PAIN, UNSPECIFIED BACK PAIN LATERALITY: ICD-10-CM

## 2024-02-26 PROCEDURE — 97161 PT EVAL LOW COMPLEX 20 MIN: CPT | Mod: PN

## 2024-02-26 PROCEDURE — 97110 THERAPEUTIC EXERCISES: CPT | Mod: PN

## 2024-02-28 NOTE — PLAN OF CARE
PT met face to face with Pipo Gutierrez PTA to discuss patient's treatment plan and progress towards established goals.  Treatment will be continued as described in initial report/eval and progress notes.  Patient will be seen by physical therapist every sixth visit and minimally once per month.

## 2024-03-04 ENCOUNTER — CLINICAL SUPPORT (OUTPATIENT)
Dept: REHABILITATION | Facility: HOSPITAL | Age: 64
End: 2024-03-04
Payer: COMMERCIAL

## 2024-03-04 DIAGNOSIS — Z74.09 IMPAIRED FUNCTIONAL MOBILITY, BALANCE, GAIT, AND ENDURANCE: Primary | ICD-10-CM

## 2024-03-04 PROCEDURE — 97140 MANUAL THERAPY 1/> REGIONS: CPT | Mod: PN,CQ

## 2024-03-04 NOTE — PROGRESS NOTES
[facility]   Physical Therapy Treatment Note      Name: Sandro Malone  Clinic Number: [id number]    Therapy Diagnosis:   Encounter Diagnosis   Name Primary?    Impaired functional mobility, balance, gait, and endurance Yes     Physician: Mariano Hampton MD    Visit Date: 3/4/2024    Physician Orders: PT Eval and Treat   Medical Diagnosis from Referral: M54.6,G89.29 (ICD-10-CM) - Chronic thoracic back pain, unspecified back pain laterality  Evaluation Date: 2/26/2024  Authorization Period Expiration: 12/31/24  Plan of Care Expiration: 6/26/24  Progress Note Due: 4/1/24  Date of Surgery: none  Visit # / Visits authorized: 1/ 12 + eval   FOTO: 1/ 3     Precautions: Standard      Time In: 255 pm  Time Out: 340 pm  Total Billable Time: 45 minutes    PTA Visit #: 1/5       Subjective     Patient reports: feeling a little better after doing his stretches.   He was compliant with home exercise program.  Response to previous treatment: good  Functional change: none    Pain: 5/10  Location:  bilateral back  and joint     Objective      Objective Measures updated at progress report unless specified.     Treatment     Sandro received the treatments listed below:      therapeutic exercises to develop strength, endurance, and ROM for 0 minutes including:  Nu step 15 min level 2   HS stretch 3 x 30sh  Piriformis stretch 3 x 30 SH    manual therapy techniques: Joint mobilizations, Myofacial release, and Soft tissue Mobilization were applied to the: lumbar for 40 minutes, including:  Myofascial Release   Bilateral piriformis    Bilateral psoas   Bilateral QL    neuromuscular re-education activities to improve: Balance, Coordination, Proprioception, and Posture for 0 minutes. The following activities were included:      direct contact modalities after being cleared for contraindications:     supervised modalities after being cleared for contradictions:     hot pack for  minutes to .    cold pack for   minutes to .    Patient Education and Home Exercises       Education provided:   - myofascial release    Written Home Exercises Provided: Patient instructed to cont prior HEP. Exercises were reviewed and Sandro was able to demonstrate them prior to the end of the session.  Sandro demonstrated good  understanding of the education provided. See Electronic Medical Record under Patient Instructions for exercises provided during therapy sessions    Assessment   Patient noted improved range of motion, decrease in tone and a little less pain after the releases.    Sandro is a 63 y.o. male referred to outpatient Physical Therapy with a medical diagnosis of low back pain. Patient presents with normalized gait. With increased muscle tone on extensor musculature. Pt is able to complete all tasks with increased pain. Pt has normal trunk ROM but pain is elicited during movements.      Sandro Is progressing well towards his goals.   Patient prognosis is Good.     Patient will continue to benefit from skilled outpatient physical therapy to address the deficits listed in the problem list box on initial evaluation, provide pt/family education and to maximize pt's level of independence in the home and community environment.     Patient's spiritual, cultural and educational needs considered and pt agreeable to plan of care and goals.     Anticipated barriers to physical therapy: none    Goals:   Short Term Goals: 3 weeks   Pt will improve pain to 3/10 at worse to be able to sleep during the night.  Pt will improve B hip extensor strength by a 1/2 grade in order to go up and down stairs with ease.  Pt will be compliant with HEP.     Long Term Goals: 6 weeks   Pt will improve pain to 0/10.  Pt will improve lumbar ROM with no pain to allow for functional actives at work to be completed.   Pt will be independent with HEP to allow for safe DC from PT.     Plan     Plan of care Certification: 2/26/2024 to 6/26/24.     Outpatient Physical  Therapy 2 times weekly for 6 weeks to include the following interventions: Electrical Stimulation  , Gait Training, Manual Therapy, Moist Heat/ Ice, Neuromuscular Re-ed, Patient Education, Therapeutic Activities, and Therapeutic Exercise.     Juventino Gutierrez, PTA

## 2024-03-05 ENCOUNTER — OFFICE VISIT (OUTPATIENT)
Dept: FAMILY MEDICINE | Facility: CLINIC | Age: 64
End: 2024-03-05
Payer: COMMERCIAL

## 2024-03-05 VITALS
SYSTOLIC BLOOD PRESSURE: 146 MMHG | BODY MASS INDEX: 25.3 KG/M2 | WEIGHT: 176.69 LBS | HEIGHT: 70 IN | OXYGEN SATURATION: 98 % | HEART RATE: 99 BPM | DIASTOLIC BLOOD PRESSURE: 77 MMHG

## 2024-03-05 DIAGNOSIS — E78.2 MIXED HYPERLIPIDEMIA: ICD-10-CM

## 2024-03-05 DIAGNOSIS — M54.6 CHRONIC THORACIC BACK PAIN, UNSPECIFIED BACK PAIN LATERALITY: ICD-10-CM

## 2024-03-05 DIAGNOSIS — G89.29 CHRONIC THORACIC BACK PAIN, UNSPECIFIED BACK PAIN LATERALITY: ICD-10-CM

## 2024-03-05 DIAGNOSIS — I10 PRIMARY HYPERTENSION: ICD-10-CM

## 2024-03-05 DIAGNOSIS — M1A.0710 IDIOPATHIC CHRONIC GOUT OF RIGHT FOOT WITHOUT TOPHUS: ICD-10-CM

## 2024-03-05 DIAGNOSIS — R97.20 PSA ELEVATION: Primary | ICD-10-CM

## 2024-03-05 DIAGNOSIS — Z13.1 DIABETES MELLITUS SCREENING: ICD-10-CM

## 2024-03-05 DIAGNOSIS — Z00.00 WELL ADULT EXAM: ICD-10-CM

## 2024-03-05 PROCEDURE — 3078F DIAST BP <80 MM HG: CPT | Mod: CPTII,S$GLB,, | Performed by: INTERNAL MEDICINE

## 2024-03-05 PROCEDURE — 1159F MED LIST DOCD IN RCRD: CPT | Mod: CPTII,S$GLB,, | Performed by: INTERNAL MEDICINE

## 2024-03-05 PROCEDURE — 3066F NEPHROPATHY DOC TX: CPT | Mod: CPTII,S$GLB,, | Performed by: INTERNAL MEDICINE

## 2024-03-05 PROCEDURE — 3077F SYST BP >= 140 MM HG: CPT | Mod: CPTII,S$GLB,, | Performed by: INTERNAL MEDICINE

## 2024-03-05 PROCEDURE — 99213 OFFICE O/P EST LOW 20 MIN: CPT | Mod: 25,S$GLB,, | Performed by: INTERNAL MEDICINE

## 2024-03-05 PROCEDURE — 1160F RVW MEDS BY RX/DR IN RCRD: CPT | Mod: CPTII,S$GLB,, | Performed by: INTERNAL MEDICINE

## 2024-03-05 PROCEDURE — 3061F NEG MICROALBUMINURIA REV: CPT | Mod: CPTII,S$GLB,, | Performed by: INTERNAL MEDICINE

## 2024-03-05 PROCEDURE — 99396 PREV VISIT EST AGE 40-64: CPT | Mod: S$GLB,,, | Performed by: INTERNAL MEDICINE

## 2024-03-05 PROCEDURE — 3008F BODY MASS INDEX DOCD: CPT | Mod: CPTII,S$GLB,, | Performed by: INTERNAL MEDICINE

## 2024-03-05 RX ORDER — ATORVASTATIN CALCIUM 20 MG/1
20 TABLET, FILM COATED ORAL NIGHTLY
Qty: 90 TABLET | Refills: 3 | Status: SHIPPED | OUTPATIENT
Start: 2024-03-05 | End: 2025-03-06

## 2024-03-05 RX ORDER — MELOXICAM 15 MG/1
15 TABLET ORAL DAILY
Qty: 60 TABLET | Refills: 1 | Status: SHIPPED | OUTPATIENT
Start: 2024-03-05 | End: 2024-04-08 | Stop reason: SDUPTHER

## 2024-03-05 RX ORDER — BENAZEPRIL HYDROCHLORIDE 20 MG/1
20 TABLET ORAL DAILY
Qty: 90 TABLET | Refills: 3 | Status: SHIPPED | OUTPATIENT
Start: 2024-03-05 | End: 2025-03-06

## 2024-03-05 NOTE — ASSESSMENT & PLAN NOTE
I gave the patient written recommendations re the outstanding vaccinations.  Get A1c  Diet ,  exercise d/w patient

## 2024-03-08 ENCOUNTER — LAB VISIT (OUTPATIENT)
Dept: LAB | Facility: CLINIC | Age: 64
End: 2024-03-08
Payer: COMMERCIAL

## 2024-03-08 DIAGNOSIS — E78.2 MIXED HYPERLIPIDEMIA: ICD-10-CM

## 2024-03-08 DIAGNOSIS — Z13.1 DIABETES MELLITUS SCREENING: ICD-10-CM

## 2024-03-08 LAB
CHOLEST SERPL-MCNC: 221 MG/DL (ref 120–199)
CHOLEST/HDLC SERPL: 3.3 {RATIO} (ref 2–5)
ESTIMATED AVG GLUCOSE: 108 MG/DL (ref 68–131)
HBA1C MFR BLD: 5.4 % (ref 4–5.6)
HDLC SERPL-MCNC: 66 MG/DL (ref 40–75)
HDLC SERPL: 29.9 % (ref 20–50)
LDLC SERPL CALC-MCNC: 140 MG/DL (ref 63–159)
NONHDLC SERPL-MCNC: 155 MG/DL
TRIGL SERPL-MCNC: 75 MG/DL (ref 30–150)

## 2024-03-08 PROCEDURE — 83036 HEMOGLOBIN GLYCOSYLATED A1C: CPT | Performed by: INTERNAL MEDICINE

## 2024-03-08 PROCEDURE — 36415 COLL VENOUS BLD VENIPUNCTURE: CPT | Mod: ,,, | Performed by: INTERNAL MEDICINE

## 2024-03-08 PROCEDURE — 80061 LIPID PANEL: CPT | Performed by: INTERNAL MEDICINE

## 2024-03-11 ENCOUNTER — CLINICAL SUPPORT (OUTPATIENT)
Dept: REHABILITATION | Facility: HOSPITAL | Age: 64
End: 2024-03-11
Payer: COMMERCIAL

## 2024-03-11 DIAGNOSIS — Z74.09 IMPAIRED FUNCTIONAL MOBILITY, BALANCE, GAIT, AND ENDURANCE: Primary | ICD-10-CM

## 2024-03-11 PROCEDURE — 97140 MANUAL THERAPY 1/> REGIONS: CPT | Mod: PN,CQ

## 2024-03-11 NOTE — PROGRESS NOTES
[facility]   Physical Therapy Treatment Note      Name: Sandro Malone  Clinic Number: [id number]    Therapy Diagnosis:   Encounter Diagnosis   Name Primary?    Impaired functional mobility, balance, gait, and endurance Yes     Physician: Mariano Hampton MD    Visit Date: 3/11/2024    Physician Orders: PT Eval and Treat   Medical Diagnosis from Referral: M54.6,G89.29 (ICD-10-CM) - Chronic thoracic back pain, unspecified back pain laterality  Evaluation Date: 2/26/2024  Authorization Period Expiration: 12/31/24  Plan of Care Expiration: 6/26/24  Progress Note Due: 4/1/24  Date of Surgery: none  Visit # / Visits authorized: 2/ 12 + eval   FOTO: 1/ 3     Precautions: Standard      Time In: 345 pm  Time Out: 410 pm  Total Billable Time: 25 minutes    PTA Visit #: 2/5       Subjective     Patient reports: feeling really good since the last treatment.  He was compliant with home exercise program.  Response to previous treatment: good  Functional change: none    Pain: 0/10  Location:  bilateral back  and joint     Objective      Objective Measures updated at progress report unless specified.     Treatment     Sandro received the treatments listed below:      therapeutic exercises to develop strength, endurance, and ROM for 0 minutes including:  Nu step 15 min level 2   HS stretch 3 x 30sh  Piriformis stretch 3 x 30 SH    manual therapy techniques: Joint mobilizations, Myofacial release, and Soft tissue Mobilization were applied to the: lumbar for 25 minutes, including:  Myofascial Release   Bilateral piriformis    Bilateral psoas   Bilateral QL    neuromuscular re-education activities to improve: Balance, Coordination, Proprioception, and Posture for 0 minutes. The following activities were included:      direct contact modalities after being cleared for contraindications:     supervised modalities after being cleared for contradictions:     hot pack for  minutes to .    cold pack for   minutes to .    Patient Education and Home Exercises       Education provided:   - myofascial release    Written Home Exercises Provided: Patient instructed to cont prior HEP. Exercises were reviewed and Sandro was able to demonstrate them prior to the end of the session.  Sandro demonstrated good  understanding of the education provided. See Electronic Medical Record under Patient Instructions for exercises provided during therapy sessions    Assessment   Patient had really good carry over from the releases.  The right piriformis was still tight today but all the others were much better.    Sandro is a 63 y.o. male referred to outpatient Physical Therapy with a medical diagnosis of low back pain. Patient presents with normalized gait. With increased muscle tone on extensor musculature. Pt is able to complete all tasks with increased pain. Pt has normal trunk ROM but pain is elicited during movements.      Sandro Is progressing well towards his goals.   Patient prognosis is Good.     Patient will continue to benefit from skilled outpatient physical therapy to address the deficits listed in the problem list box on initial evaluation, provide pt/family education and to maximize pt's level of independence in the home and community environment.     Patient's spiritual, cultural and educational needs considered and pt agreeable to plan of care and goals.     Anticipated barriers to physical therapy: none    Goals:   Short Term Goals: 3 weeks   Pt will improve pain to 3/10 at worse to be able to sleep during the night.  Pt will improve B hip extensor strength by a 1/2 grade in order to go up and down stairs with ease.  Pt will be compliant with HEP.     Long Term Goals: 6 weeks   Pt will improve pain to 0/10.  Pt will improve lumbar ROM with no pain to allow for functional actives at work to be completed.   Pt will be independent with HEP to allow for safe DC from PT.     Plan     Plan of care Certification: 2/26/2024 to  6/26/24.     Outpatient Physical Therapy 2 times weekly for 6 weeks to include the following interventions: Electrical Stimulation  , Gait Training, Manual Therapy, Moist Heat/ Ice, Neuromuscular Re-ed, Patient Education, Therapeutic Activities, and Therapeutic Exercise.     Juventino Gutierrez, PTA

## 2024-03-18 ENCOUNTER — CLINICAL SUPPORT (OUTPATIENT)
Dept: REHABILITATION | Facility: HOSPITAL | Age: 64
End: 2024-03-18
Payer: COMMERCIAL

## 2024-03-18 DIAGNOSIS — Z74.09 IMPAIRED FUNCTIONAL MOBILITY, BALANCE, GAIT, AND ENDURANCE: Primary | ICD-10-CM

## 2024-03-18 PROCEDURE — 97110 THERAPEUTIC EXERCISES: CPT | Mod: PN,CQ

## 2024-03-18 NOTE — PROGRESS NOTES
[facility]   Physical Therapy Treatment Note      Name: Sandro Malone  Clinic Number: [id number]    Therapy Diagnosis:   Encounter Diagnosis   Name Primary?    Impaired functional mobility, balance, gait, and endurance Yes     Physician: Mariano Hampton MD    Visit Date: 3/18/2024    Physician Orders: PT Eval and Treat   Medical Diagnosis from Referral: M54.6,G89.29 (ICD-10-CM) - Chronic thoracic back pain, unspecified back pain laterality  Evaluation Date: 2/26/2024  Authorization Period Expiration: 12/31/24  Plan of Care Expiration: 6/26/24  Progress Note Due: 4/1/24  Date of Surgery: none  Visit # / Visits authorized: 3 / 12 + eval   FOTO: 1/ 3     Precautions: Standard      Time In: 1:15 PM  Time Out: 2:05 PM  Total Billable Time: 40 minutes    PTA Visit #: 3/5       Subjective     Patient reports: A little sore today, but that could be because I tried to weed eat my yard this morning.   He was compliant with home exercise program.  Response to previous treatment: good  Functional change: none    Pain: 5/10  Location:  bilateral back  and joint     Objective      Objective Measures updated at progress report unless specified.     Treatment     Sandro received the treatments listed below:      therapeutic exercises to develop strength, endurance, and ROM for 40 minutes including:  Nu step 15 min level 2  Heel Cord stretch on wedge 3 x 30 sec  Seated Lumbar flex w/ PB x 2 min   HS stretch 3 x 30 sec  Piriformis stretch 3 x 30 sec  Seated Quadratus stretch 2 x 30 sec  TrAb w/ PB x 15  Pelvic Tilts x 15   DKC w/ PB x 2 min  LTR w\/ PB x 2 min  S/L Thoracic rotation x 10 ea    manual therapy techniques: Joint mobilizations, Myofacial release, and Soft tissue Mobilization were applied to the: lumbar for 0 minutes, including:  Myofascial Release   Bilateral piriformis    Bilateral psoas   Bilateral QL    neuromuscular re-education activities to improve: Balance, Coordination,  Proprioception, and Posture for 0 minutes. The following activities were included:      direct contact modalities after being cleared for contraindications:     supervised modalities after being cleared for contradictions:     hot pack for  minutes to .    cold pack for  minutes to .    Patient Education and Home Exercises       Education provided:   - myofascial release    Written Home Exercises Provided: Patient instructed to cont prior HEP. Exercises were reviewed and Sandro was able to demonstrate them prior to the end of the session.  Sandro demonstrated good  understanding of the education provided. See Electronic Medical Record under Patient Instructions for exercises provided during therapy sessions    Assessment   Sandro did well with exercises today. No exacerbations noted. Patient works hard and is motivated to return to [facility].     Sandro is a 63 y.o. male referred to outpatient Physical Therapy with a medical diagnosis of low back pain. Patient presents with normalized gait. With increased muscle tone on extensor musculature. Pt is able to complete all tasks with increased pain. Pt has normal trunk ROM but pain is elicited during movements.      Sandro Is progressing well towards his goals.   Patient prognosis is Good.     Patient will continue to benefit from skilled outpatient physical therapy to address the deficits listed in the problem list box on initial evaluation, provide pt/family education and to maximize pt's level of independence in the home and community environment.     Patient's spiritual, cultural and educational needs considered and pt agreeable to plan of care and goals.     Anticipated barriers to physical therapy: none    Goals:   Short Term Goals: 3 weeks   Pt will improve pain to 3/10 at worse to be able to sleep during the night.  Pt will improve B hip extensor strength by a 1/2 grade in order to go up and down stairs with ease.  Pt will be compliant with HEP.     Long Term Goals: 6  weeks   Pt will improve pain to 0/10.  Pt will improve lumbar ROM with no pain to allow for functional actives at work to be completed.   Pt will be independent with HEP to allow for safe DC from PT.     Plan     Plan of care Certification: 2/26/2024 to 6/26/24.     Outpatient Physical Therapy 2 times weekly for 6 weeks to include the following interventions: Electrical Stimulation  , Gait Training, Manual Therapy, Moist Heat/ Ice, Neuromuscular Re-ed, Patient Education, Therapeutic Activities, and Therapeutic Exercise.     Jonathan Favre, PTA

## 2024-03-22 RX ORDER — GABAPENTIN 100 MG/1
100 CAPSULE ORAL 3 TIMES DAILY
Qty: 90 CAPSULE | Refills: 2 | Status: SHIPPED | OUTPATIENT
Start: 2024-03-22 | End: 2024-06-20

## 2024-03-22 NOTE — TELEPHONE ENCOUNTER
Refill Routing Note   Medication(s) are not appropriate for processing by [facility] for the following reason(s):        Outside of protocol    [facility] action(s):  Route             Appointments  past 12m or future 3m with PCP    Date Provider   Last Visit   3/5/2024 Ziggy Bates MD   Next Visit   4/2/2024 Ziggy Bates MD   ED visits in past 90 days: 0        Note composed:11:18 AM 03/22/2024

## 2024-03-22 NOTE — TELEPHONE ENCOUNTER
Care Due:                  Date            Visit Type   Department     Provider  --------------------------------------------------------------------------------                                EP -                              PRIMARY      [facility] FAMILY  Last Visit: 03-      CARE ([facility])   SAMARA Bates                              EP -                              PRIMARY      [facility]  Next Visit: 04-      CARE ([facility])   [facility]       Ziggy Bates                                                            Last  Test          Frequency    Reason                     Performed    Due Date  --------------------------------------------------------------------------------    CBC.........  12 months..  meloxicam................  Not Found    Overdue    [employer] Embedded Care Due Messages. Reference number: [id number].   3/22/2024 11:17:30 AM CDT

## 2024-04-01 ENCOUNTER — CLINICAL SUPPORT (OUTPATIENT)
Dept: REHABILITATION | Facility: HOSPITAL | Age: 64
End: 2024-04-01
Payer: COMMERCIAL

## 2024-04-01 DIAGNOSIS — Z74.09 IMPAIRED FUNCTIONAL MOBILITY, BALANCE, GAIT, AND ENDURANCE: Primary | ICD-10-CM

## 2024-04-01 PROCEDURE — 97110 THERAPEUTIC EXERCISES: CPT | Mod: PN,CQ

## 2024-04-01 NOTE — PROGRESS NOTES
[facility]   Physical Therapy Treatment Note      Name: Sandro Malone  Clinic Number: [id number]    Therapy Diagnosis:   Encounter Diagnosis   Name Primary?    Impaired functional mobility, balance, gait, and endurance Yes     Physician: Mariano Hampton MD    Visit Date: 4/1/2024    Physician Orders: PT Eval and Treat   Medical Diagnosis from Referral: M54.6,G89.29 (ICD-10-CM) - Chronic thoracic back pain, unspecified back pain laterality  Evaluation Date: 2/26/2024  Authorization Period Expiration: 12/31/24  Plan of Care Expiration: 6/26/24  Progress Note Due: 4/1/24  Date of Surgery: none  Visit # / Visits authorized: 3 / 12 + eval   FOTO: 1/ 3     Precautions: Standard      Time In: 2:15 PM  Time Out: 2:55 PM  Total Billable Time: 40 minutes    PTA Visit #: 4/5       Subjective     Patient reports: a little sore from working.  He was compliant with home exercise program.  Response to previous treatment: good  Functional change: none    Pain: 5/10  Location:  bilateral back  and joint     Objective      Objective Measures updated at progress report unless specified.     Treatment     Sandro received the treatments listed below:      therapeutic exercises to develop strength, endurance, and ROM for 40 minutes including:  Nu step 15 min level 2  Heel Cord stretch on wedge 3 x 30 sec  Seated Lumbar flex w/ PB x 2 min   HS stretch 3 x 30 sec  Piriformis stretch 3 x 30 sec  Seated Quadratus stretch 2 x 30 sec  TrAb w/ PB x 15  Pelvic Tilts x 15   DKC w/ PB x 2 min  LTR w\/ PB x 2 min  S/L Thoracic rotation x 10 ea    manual therapy techniques: Joint mobilizations, Myofacial release, and Soft tissue Mobilization were applied to the: lumbar for 0 minutes, including:  Myofascial Release   Bilateral piriformis    Bilateral psoas   Bilateral QL    neuromuscular re-education activities to improve: Balance, Coordination, Proprioception, and Posture for 0 minutes. The following activities were  included:      direct contact modalities after being cleared for contraindications:     supervised modalities after being cleared for contradictions:     hot pack for  minutes to .    cold pack for  minutes to .    Patient Education and Home Exercises       Education provided:   - myofascial release    Written Home Exercises Provided: Patient instructed to cont prior HEP. Exercises were reviewed and Sandro was able to demonstrate them prior to the end of the session.  Sandro demonstrated good  understanding of the education provided. See Electronic Medical Record under Patient Instructions for exercises provided during therapy sessions    Assessment   Sandro did well with exercises today. No exacerbations noted. Patient works hard and is motivated to return to [facility].     Sandro is a 63 y.o. male referred to outpatient Physical Therapy with a medical diagnosis of low back pain. Patient presents with normalized gait. With increased muscle tone on extensor musculature. Pt is able to complete all tasks with increased pain. Pt has normal trunk ROM but pain is elicited during movements.      Sandro Is progressing well towards his goals.   Patient prognosis is Good.     Patient will continue to benefit from skilled outpatient physical therapy to address the deficits listed in the problem list box on initial evaluation, provide pt/family education and to maximize pt's level of independence in the home and community environment.     Patient's spiritual, cultural and educational needs considered and pt agreeable to plan of care and goals.     Anticipated barriers to physical therapy: none    Goals:   Short Term Goals: 3 weeks   Pt will improve pain to 3/10 at worse to be able to sleep during the night.  Pt will improve B hip extensor strength by a 1/2 grade in order to go up and down stairs with ease.  Pt will be compliant with HEP.     Long Term Goals: 6 weeks   Pt will improve pain to 0/10.  Pt will improve lumbar ROM with no  pain to allow for functional actives at work to be completed.   Pt will be independent with HEP to allow for safe DC from PT.     Plan     Plan of care Certification: 2/26/2024 to 6/26/24.     Outpatient Physical Therapy 2 times weekly for 6 weeks to include the following interventions: Electrical Stimulation  , Gait Training, Manual Therapy, Moist Heat/ Ice, Neuromuscular Re-ed, Patient Education, Therapeutic Activities, and Therapeutic Exercise.     Juventino Gutierrez, PTA

## 2024-04-03 ENCOUNTER — TELEPHONE (OUTPATIENT)
Dept: UROLOGY | Facility: CLINIC | Age: 64
End: 2024-04-03
Payer: COMMERCIAL

## 2024-04-03 NOTE — TELEPHONE ENCOUNTER
Referral placed for elevated psa   Offered sooner with md in urology in [place]   Pt accepted   Pt declines past urological care  Pt was informed will need urine upon arrival to visit   Pt vu

## 2024-04-08 ENCOUNTER — OFFICE VISIT (OUTPATIENT)
Dept: UROLOGY | Facility: CLINIC | Age: 64
End: 2024-04-08
Payer: COMMERCIAL

## 2024-04-08 ENCOUNTER — OFFICE VISIT (OUTPATIENT)
Dept: FAMILY MEDICINE | Facility: CLINIC | Age: 64
End: 2024-04-08
Payer: COMMERCIAL

## 2024-04-08 ENCOUNTER — LAB VISIT (OUTPATIENT)
Dept: LAB | Facility: CLINIC | Age: 64
End: 2024-04-08
Payer: COMMERCIAL

## 2024-04-08 VITALS
SYSTOLIC BLOOD PRESSURE: 159 MMHG | WEIGHT: 176.81 LBS | HEIGHT: 70 IN | BODY MASS INDEX: 25.31 KG/M2 | DIASTOLIC BLOOD PRESSURE: 93 MMHG | HEART RATE: 82 BPM

## 2024-04-08 VITALS
SYSTOLIC BLOOD PRESSURE: 126 MMHG | BODY MASS INDEX: 25.34 KG/M2 | OXYGEN SATURATION: 97 % | HEIGHT: 70 IN | WEIGHT: 177 LBS | DIASTOLIC BLOOD PRESSURE: 64 MMHG | HEART RATE: 98 BPM

## 2024-04-08 DIAGNOSIS — G89.29 CHRONIC THORACIC BACK PAIN, UNSPECIFIED BACK PAIN LATERALITY: ICD-10-CM

## 2024-04-08 DIAGNOSIS — Z13.1 DIABETES MELLITUS SCREENING: ICD-10-CM

## 2024-04-08 DIAGNOSIS — Z11.3 SCREEN FOR STD (SEXUALLY TRANSMITTED DISEASE): ICD-10-CM

## 2024-04-08 DIAGNOSIS — R97.20 ELEVATED PSA: Primary | ICD-10-CM

## 2024-04-08 DIAGNOSIS — M54.6 CHRONIC THORACIC BACK PAIN, UNSPECIFIED BACK PAIN LATERALITY: ICD-10-CM

## 2024-04-08 DIAGNOSIS — R97.20 ELEVATED PSA: ICD-10-CM

## 2024-04-08 DIAGNOSIS — Z11.3 SCREEN FOR STD (SEXUALLY TRANSMITTED DISEASE): Primary | ICD-10-CM

## 2024-04-08 DIAGNOSIS — M25.50 ARTHRALGIA, UNSPECIFIED JOINT: ICD-10-CM

## 2024-04-08 DIAGNOSIS — R97.20 PSA ELEVATION: ICD-10-CM

## 2024-04-08 PROCEDURE — 86706 HEP B SURFACE ANTIBODY: CPT | Performed by: INTERNAL MEDICINE

## 2024-04-08 PROCEDURE — 4010F ACE/ARB THERAPY RXD/TAKEN: CPT | Mod: CPTII,S$GLB,, | Performed by: UROLOGY

## 2024-04-08 PROCEDURE — 4010F ACE/ARB THERAPY RXD/TAKEN: CPT | Mod: CPTII,S$GLB,, | Performed by: INTERNAL MEDICINE

## 2024-04-08 PROCEDURE — 3074F SYST BP LT 130 MM HG: CPT | Mod: CPTII,S$GLB,, | Performed by: INTERNAL MEDICINE

## 2024-04-08 PROCEDURE — 3077F SYST BP >= 140 MM HG: CPT | Mod: CPTII,S$GLB,, | Performed by: UROLOGY

## 2024-04-08 PROCEDURE — 3061F NEG MICROALBUMINURIA REV: CPT | Mod: CPTII,S$GLB,, | Performed by: UROLOGY

## 2024-04-08 PROCEDURE — 86593 SYPHILIS TEST NON-TREP QUANT: CPT | Performed by: INTERNAL MEDICINE

## 2024-04-08 PROCEDURE — 3044F HG A1C LEVEL LT 7.0%: CPT | Mod: CPTII,S$GLB,, | Performed by: UROLOGY

## 2024-04-08 PROCEDURE — 3061F NEG MICROALBUMINURIA REV: CPT | Mod: CPTII,S$GLB,, | Performed by: INTERNAL MEDICINE

## 2024-04-08 PROCEDURE — 99204 OFFICE O/P NEW MOD 45 MIN: CPT | Mod: S$GLB,,, | Performed by: UROLOGY

## 2024-04-08 PROCEDURE — 3066F NEPHROPATHY DOC TX: CPT | Mod: CPTII,S$GLB,, | Performed by: UROLOGY

## 2024-04-08 PROCEDURE — 3078F DIAST BP <80 MM HG: CPT | Mod: CPTII,S$GLB,, | Performed by: INTERNAL MEDICINE

## 2024-04-08 PROCEDURE — 99999 PR PBB SHADOW E&M-EST. PATIENT-LVL IV: CPT | Mod: PBBFAC,,, | Performed by: UROLOGY

## 2024-04-08 PROCEDURE — 3008F BODY MASS INDEX DOCD: CPT | Mod: CPTII,S$GLB,, | Performed by: INTERNAL MEDICINE

## 2024-04-08 PROCEDURE — 3044F HG A1C LEVEL LT 7.0%: CPT | Mod: CPTII,S$GLB,, | Performed by: INTERNAL MEDICINE

## 2024-04-08 PROCEDURE — 1160F RVW MEDS BY RX/DR IN RCRD: CPT | Mod: CPTII,S$GLB,, | Performed by: INTERNAL MEDICINE

## 2024-04-08 PROCEDURE — 1159F MED LIST DOCD IN RCRD: CPT | Mod: CPTII,S$GLB,, | Performed by: INTERNAL MEDICINE

## 2024-04-08 PROCEDURE — 99214 OFFICE O/P EST MOD 30 MIN: CPT | Mod: S$GLB,,, | Performed by: INTERNAL MEDICINE

## 2024-04-08 PROCEDURE — 3008F BODY MASS INDEX DOCD: CPT | Mod: CPTII,S$GLB,, | Performed by: UROLOGY

## 2024-04-08 PROCEDURE — 3066F NEPHROPATHY DOC TX: CPT | Mod: CPTII,S$GLB,, | Performed by: INTERNAL MEDICINE

## 2024-04-08 PROCEDURE — 36415 COLL VENOUS BLD VENIPUNCTURE: CPT | Mod: ,,, | Performed by: INTERNAL MEDICINE

## 2024-04-08 PROCEDURE — 1159F MED LIST DOCD IN RCRD: CPT | Mod: CPTII,S$GLB,, | Performed by: UROLOGY

## 2024-04-08 PROCEDURE — 3080F DIAST BP >= 90 MM HG: CPT | Mod: CPTII,S$GLB,, | Performed by: UROLOGY

## 2024-04-08 PROCEDURE — 83036 HEMOGLOBIN GLYCOSYLATED A1C: CPT | Performed by: INTERNAL MEDICINE

## 2024-04-08 PROCEDURE — 86592 SYPHILIS TEST NON-TREP QUAL: CPT | Performed by: INTERNAL MEDICINE

## 2024-04-08 RX ORDER — CIPROFLOXACIN 500 MG/1
500 TABLET ORAL 2 TIMES DAILY
Qty: 6 TABLET | Refills: 0 | Status: SHIPPED | OUTPATIENT
Start: 2024-04-08

## 2024-04-08 RX ORDER — MELOXICAM 15 MG/1
15 TABLET ORAL DAILY
Qty: 30 TABLET | Refills: 2 | Status: SHIPPED | OUTPATIENT
Start: 2024-04-08 | End: 2024-07-07

## 2024-04-08 NOTE — PATIENT INSTRUCTIONS
BEFORE YOUR Prostate Biopsy on 5/7/24   1. 3 Days Before: Hold Eliquis   2. 7 Days Before: NO Fish oil, Omega 3, Vitamin E, Goodies/ BC Powders, [place], Aspirin, Plavix, Coumadin, Heparin, Lovenox and or Celebrex   3. Take antibiotics as prescribed- Cipro 500mg twice daily (AM/PM)    A Day before biopsy (AM/PM)   B Day of biopsy (AM/PM)    C.Day after biopsy (AM/PM)   4. 1 fleet enema (Over the counter) AT HOME morning of the procedure before arriving (at least 1-2 hours before leaving home)   5. LOCAL ANESTHESIA: no fasting needed.please eat that day breakfast and/or lunch. Can drive yourself to/from      1. AVOID sexual activity, lifting, strenuous physical activity or exertion for 3 days following biopsy.    2. NO riding mowers, tractors, bicycles, motorcycles for 2-3 weeks.   3. You may experience blood in your urine or stool for up to 2 WEEKS and in your semen for up to 6 WEEKS. THIS IS A NORMAL SIDE EFFECT OF PROCEDURE AND WILL RESOLVE !   4. DRINK, DRINK, DRINK PLENTY WATER !!!    5. Take antibiotics as prescribed above    6. If you experience any of the following conditions, please return immediately to the clinic (during office hours) or Emergency Room (if after hours): - FEVER - INABILITY TO URINATE - SEVERE BLEEDING    7. You may resume aspirin, anti-inflammatory and blood thinners in 3 days     You will get call day prior or Friday prior more commonly with an afternoon arrival time for your procedure date
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
## 2024-04-08 NOTE — PROGRESS NOTES
Subjective:       Patient ID: Sandro Malone is a 63 y.o. male.    Chief Complaint: Follow-up (Follow up per labs, medication refills )      Patient is established already .......... presents today for a f/u visit , to discuss labs results and for management of the chronic conditions.          Follow-up  This is a chronic problem. The current episode started more than 1 year ago. The problem occurs intermittently. The problem has been unchanged. Associated symptoms include arthralgias. Pertinent negatives include no fever. Nothing aggravates the symptoms. Treatments tried: See MAR. The treatment provided significant relief.     Review of Systems   Constitutional:  Negative for activity change, appetite change and fever.   Respiratory: Negative.     Cardiovascular: Negative.    Gastrointestinal: Negative.    Musculoskeletal:  Positive for arthralgias.         Objective:      Physical Exam  Vitals and nursing note reviewed.   Constitutional:       Appearance: Normal appearance.   Cardiovascular:      Rate and Rhythm: Normal rate and regular rhythm.      Pulses: Normal pulses.      Heart sounds: Normal heart sounds. No murmur heard.     No friction rub. No gallop.   Pulmonary:      Effort: Pulmonary effort is normal.      Breath sounds: Normal breath sounds. No wheezing.   Abdominal:      General: Abdomen is flat.   Musculoskeletal:         General: Normal range of motion.   Skin:     General: Skin is warm and dry.   Neurological:      General: No focal deficit present.      Mental Status: He is alert and oriented to person, place, and time.   Psychiatric:         Mood and Affect: Mood normal.         Assessment:       1. Screen for STD (sexually transmitted disease)  Overview:  Patient presents for a wellness visit  No new c/o today  Please refer to initial intake notes for screening/preventive measures  All histories and immunization schedule reviewed with patient      Assessment & Plan:  Add RPR, hep B per pt  request  He has a new girlfriend    Orders:  -     Hepatitis B surface antibody; Future; Expected date: 04/08/2024  -     RPR (for monitoring); Future; Expected date: 04/08/2024    2. Chronic thoracic back pain, unspecified back pain laterality  -     meloxicam (MOBIC) 15 MG tablet; Take 1 tablet (15 mg total) by mouth once daily.  Dispense: 30 tablet; Refill: 2    3. Elevated PSA  Overview:  Stable    Assessment & Plan:  F/u with Uro in [place]      4. Arthralgia, unspecified joint  Overview:  Stable    Assessment & Plan:  Refill mobic             Plan:       1. Screen for STD (sexually transmitted disease)  Overview:  Patient presents for a wellness visit  No new c/o today  Please refer to initial intake notes for screening/preventive measures  All histories and immunization schedule reviewed with patient      Assessment & Plan:  Add RPR, hep B per pt request  He has a new girlfriend    Orders:  -     Hepatitis B surface antibody; Future; Expected date: 04/08/2024  -     RPR (for monitoring); Future; Expected date: 04/08/2024    2. Chronic thoracic back pain, unspecified back pain laterality  -     meloxicam (MOBIC) 15 MG tablet; Take 1 tablet (15 mg total) by mouth once daily.  Dispense: 30 tablet; Refill: 2    3. Elevated PSA  Overview:  Stable    Assessment & Plan:  F/u with Uro in [place]      4. Arthralgia, unspecified joint  Overview:  Stable    Assessment & Plan:  Refill mobic

## 2024-04-08 NOTE — PROGRESS NOTES
Procedure Order to Urology [[id number]]    Electronically signed by: Mariano Ortez MD on 04/08/24 1222 Status: Active   Ordering user: Mariano Ortez MD 04/08/24 1222 Authorized by: Mariano Ortez MD   Ordering mode: Standard   Frequency:  04/08/24 -     Diagnoses  Elevated PSA [R97.20]   Questionnaire    Question Answer   Procedure Prostate Biopsy Comment - 5/7   Facility Name: [facility], please order local sedation, UA poct   preop IM Gentamyacin 80mg,160mg if over 300 lbs

## 2024-04-09 LAB
ESTIMATED AVG GLUCOSE: 114 MG/DL (ref 68–131)
HBA1C MFR BLD: 5.6 % (ref 4–5.6)
HBV SURFACE AB SER-ACNC: <3 MIU/ML
HBV SURFACE AB SER-ACNC: NORMAL M[IU]/ML

## 2024-04-09 RX ORDER — GENTAMICIN 40 MG/ML
80 INJECTION, SOLUTION INTRAMUSCULAR; INTRAVENOUS
Status: SHIPPED | OUTPATIENT
Start: 2024-04-09

## 2024-04-10 DIAGNOSIS — A53.0 POSITIVE RPR TEST: Primary | ICD-10-CM

## 2024-04-10 LAB
RPR SER QL: REACTIVE
RPR SER-TITR: ABNORMAL {TITER}

## 2024-07-05 NOTE — PROGRESS NOTES
ER does not have correct size tube, Nursing  Supervisor called and only a 20FR tube was found and was placed by ER MD.     Monica Zimmer RN  07/05/24 1715       Monica Zimmer RN  07/05/24 4560     Subjective:       Patient ID: Sandro Malone is a 63 y.o. male.    Chief Complaint: Follow-up and Medication Refill (Follow up,  medication refills )      Patient presents for a wellness visit  No new c/o today  Please refer to initial intake notes for screening/preventive measures  All histories and immunization schedule reviewed with patient        Follow-up  Pertinent negatives include no fever.   Medication Refill  Pertinent negatives include no fever.   Hypertension  This is a chronic problem. The current episode started more than 1 year ago. The problem has been gradually worsening since onset. The problem is uncontrolled. Agents associated with hypertension include NSAIDs. Risk factors for coronary artery disease include dyslipidemia, male gender, stress and sedentary lifestyle. Past treatments include calcium channel blockers and angiotensin blockers. The current treatment provides moderate improvement. Compliance problems include diet and exercise.      Review of Systems   Constitutional:  Negative for activity change, appetite change and fever.   Respiratory: Negative.     Cardiovascular: Negative.    Gastrointestinal: Negative.    Musculoskeletal: Negative.           Objective:      Physical Exam  Vitals and nursing note reviewed.   Constitutional:       Appearance: Normal appearance.   Cardiovascular:      Rate and Rhythm: Normal rate and regular rhythm.      Pulses: Normal pulses.      Heart sounds: Normal heart sounds. No murmur heard.     No friction rub. No gallop.   Pulmonary:      Effort: Pulmonary effort is normal.      Breath sounds: Normal breath sounds. No wheezing.   Skin:     General: Skin is warm and dry.   Neurological:      Mental Status: He is alert.   Psychiatric:         Mood and Affect: Mood normal.         Assessment:       1. PSA elevation  -     Ambulatory referral/consult to Urology; Future; Expected date: 03/12/2024    2. Well adult exam  Overview:  Patient presents for a  wellness visit  No new c/o today  Please refer to initial intake notes for screening/preventive measures  All histories and immunization schedule reviewed with patient      Assessment & Plan:   I gave the patient written recommendations re the outstanding vaccinations.  Get A1c  Diet ,  exercise d/w patient        3. Primary hypertension  Overview:  Elevated  He's out of meds for 1 week      4. Chronic thoracic back pain, unspecified back pain laterality  -     meloxicam (MOBIC) 15 MG tablet; Take 1 tablet (15 mg total) by mouth once daily.  Dispense: 60 tablet; Refill: 1    5. Mixed hyperlipidemia  Overview:  Usually controlled     Assessment & Plan:  He's been out   Recheck lipids    Orders:  -     Lipid Panel; Future; Expected date: 03/05/2024    6. Diabetes mellitus screening  -     Hemoglobin A1C; Standing    7. Idiopathic chronic gout of right foot without tophus  Overview:  Stable    Assessment & Plan:  Off allopurinol  Refill mobic      Other orders  -     benazepriL (LOTENSIN) 20 MG tablet; Take 1 tablet (20 mg total) by mouth once daily.  Dispense: 90 tablet; Refill: 3  -     atorvastatin (LIPITOR) 20 MG tablet; Take 1 tablet (20 mg total) by mouth every evening.  Dispense: 90 tablet; Refill: 3           Plan:       1. PSA elevation  -     Ambulatory referral/consult to Urology; Future; Expected date: 03/12/2024    2. Well adult exam  Overview:  Patient presents for a wellness visit  No new c/o today  Please refer to initial intake notes for screening/preventive measures  All histories and immunization schedule reviewed with patient      Assessment & Plan:   I gave the patient written recommendations re the outstanding vaccinations.  Get A1c  Diet ,  exercise d/w patient        3. Primary hypertension  Overview:  Elevated  He's out of meds for 1 week      4. Chronic thoracic back pain, unspecified back pain laterality  -     meloxicam (MOBIC) 15 MG tablet; Take 1 tablet (15 mg total) by mouth once daily.   Dispense: 60 tablet; Refill: 1    5. Mixed hyperlipidemia  Overview:  Usually controlled     Assessment & Plan:  He's been out   Recheck lipids    Orders:  -     Lipid Panel; Future; Expected date: 03/05/2024    6. Diabetes mellitus screening  -     Hemoglobin A1C; Standing    7. Idiopathic chronic gout of right foot without tophus  Overview:  Stable    Assessment & Plan:  Off allopurinol  Refill mobic      Other orders  -     benazepriL (LOTENSIN) 20 MG tablet; Take 1 tablet (20 mg total) by mouth once daily.  Dispense: 90 tablet; Refill: 3  -     atorvastatin (LIPITOR) 20 MG tablet; Take 1 tablet (20 mg total) by mouth every evening.  Dispense: 90 tablet; Refill: 3

## 2024-07-31 DIAGNOSIS — G89.29 CHRONIC THORACIC BACK PAIN, UNSPECIFIED BACK PAIN LATERALITY: ICD-10-CM

## 2024-07-31 DIAGNOSIS — M54.6 CHRONIC THORACIC BACK PAIN, UNSPECIFIED BACK PAIN LATERALITY: ICD-10-CM

## 2024-07-31 RX ORDER — MELOXICAM 15 MG/1
15 TABLET ORAL
Qty: 30 TABLET | Refills: 2 | Status: SHIPPED | OUTPATIENT
Start: 2024-07-31

## 2024-07-31 NOTE — TELEPHONE ENCOUNTER
No care due was identified.  Health [facility] Embedded Care Due Messages. Reference number: [id number].   7/31/2024 1:01:36 PM CDT

## 2024-07-31 NOTE — TELEPHONE ENCOUNTER
Refill Routing Note   Medication(s) are not appropriate for processing by [facility] for the following reason(s):        Outside of protocol    [facility] action(s):  Route               Appointments  past 12m or future 3m with PCP    Date Provider   Last Visit   4/8/2024 Ziggy Bates MD   Next Visit   10/8/2024 Ziggy Bates MD   ED visits in past 90 days: 0        Note composed:3:26 PM 07/31/2024

## 2024-08-30 ENCOUNTER — TELEPHONE (OUTPATIENT)
Dept: FAMILY MEDICINE | Facility: CLINIC | Age: 64
End: 2024-08-30
Payer: COMMERCIAL

## 2024-08-30 NOTE — TELEPHONE ENCOUNTER
----- Message from Damián Gonzalez sent at 8/30/2024 10:52 AM CDT -----  Regarding: LAB RESULTS  Pt came into clinic and requested a print out of his most recent lab results. Pt would like to be notified by phone and portal message when the results are available for .

## 2024-10-08 ENCOUNTER — OFFICE VISIT (OUTPATIENT)
Dept: FAMILY MEDICINE | Facility: CLINIC | Age: 64
End: 2024-10-08
Payer: COMMERCIAL

## 2024-10-08 VITALS
HEIGHT: 70 IN | HEART RATE: 76 BPM | SYSTOLIC BLOOD PRESSURE: 118 MMHG | BODY MASS INDEX: 25.61 KG/M2 | DIASTOLIC BLOOD PRESSURE: 76 MMHG | WEIGHT: 178.88 LBS | OXYGEN SATURATION: 97 %

## 2024-10-08 DIAGNOSIS — I10 PRIMARY HYPERTENSION: ICD-10-CM

## 2024-10-08 DIAGNOSIS — I10 ESSENTIAL HYPERTENSION, BENIGN: ICD-10-CM

## 2024-10-08 DIAGNOSIS — A53.0 LATENT SYPHILIS, UNSPECIFIED AS EARLY OR LATE: Primary | ICD-10-CM

## 2024-10-08 DIAGNOSIS — Z12.5 SPECIAL SCREENING FOR MALIGNANT NEOPLASM OF PROSTATE: ICD-10-CM

## 2024-10-08 DIAGNOSIS — E78.2 MIXED HYPERLIPIDEMIA: ICD-10-CM

## 2024-10-08 PROCEDURE — 3078F DIAST BP <80 MM HG: CPT | Mod: CPTII,S$GLB,, | Performed by: INTERNAL MEDICINE

## 2024-10-08 PROCEDURE — 99214 OFFICE O/P EST MOD 30 MIN: CPT | Mod: S$GLB,,, | Performed by: INTERNAL MEDICINE

## 2024-10-08 PROCEDURE — G2211 COMPLEX E/M VISIT ADD ON: HCPCS | Mod: S$GLB,,, | Performed by: INTERNAL MEDICINE

## 2024-10-08 PROCEDURE — 1159F MED LIST DOCD IN RCRD: CPT | Mod: CPTII,S$GLB,, | Performed by: INTERNAL MEDICINE

## 2024-10-08 PROCEDURE — 3044F HG A1C LEVEL LT 7.0%: CPT | Mod: CPTII,S$GLB,, | Performed by: INTERNAL MEDICINE

## 2024-10-08 PROCEDURE — 3074F SYST BP LT 130 MM HG: CPT | Mod: CPTII,S$GLB,, | Performed by: INTERNAL MEDICINE

## 2024-10-08 PROCEDURE — 3008F BODY MASS INDEX DOCD: CPT | Mod: CPTII,S$GLB,, | Performed by: INTERNAL MEDICINE

## 2024-10-08 PROCEDURE — 4010F ACE/ARB THERAPY RXD/TAKEN: CPT | Mod: CPTII,S$GLB,, | Performed by: INTERNAL MEDICINE

## 2024-10-08 PROCEDURE — 3061F NEG MICROALBUMINURIA REV: CPT | Mod: CPTII,S$GLB,, | Performed by: INTERNAL MEDICINE

## 2024-10-08 PROCEDURE — 3066F NEPHROPATHY DOC TX: CPT | Mod: CPTII,S$GLB,, | Performed by: INTERNAL MEDICINE

## 2024-10-08 PROCEDURE — 1160F RVW MEDS BY RX/DR IN RCRD: CPT | Mod: CPTII,S$GLB,, | Performed by: INTERNAL MEDICINE

## 2024-10-08 NOTE — PROGRESS NOTES
Subjective:       Patient ID: Sandro Malone is a 64 y.o. male.    Chief Complaint: Follow-up      Patient is established already .......... presents today for a f/u visit , to discuss labs results and for management of the chronic conditions.        Follow-up  This is a chronic problem. The current episode started more than 1 year ago. The problem occurs intermittently. The problem has been unchanged. Pertinent negatives include no fever. Nothing aggravates the symptoms. Treatments tried: See med list.     Review of Systems   Constitutional:  Negative for activity change, appetite change and fever.   Respiratory: Negative.     Cardiovascular: Negative.    Gastrointestinal: Negative.    Musculoskeletal: Negative.          Objective:      Physical Exam  Vitals and nursing note reviewed.   Constitutional:       Appearance: Normal appearance.   Cardiovascular:      Rate and Rhythm: Normal rate and regular rhythm.      Pulses: Normal pulses.      Heart sounds: Normal heart sounds. No murmur heard.     No friction rub. No gallop.   Pulmonary:      Effort: Pulmonary effort is normal.      Breath sounds: Normal breath sounds. No wheezing.   Skin:     General: Skin is warm and dry.   Neurological:      Mental Status: He is alert.   Psychiatric:         Mood and Affect: Mood normal.         Assessment:       1. Latent syphilis, unspecified as early or late  Overview:  Asymptomatic  S/p Rx with IM PCN for 3 weeks    Assessment & Plan:  Monitor RPR titers  Safe sex habits    Orders:  -     RPR (for monitoring); Future; Expected date: 10/08/2024    2. Essential hypertension, benign  -     Microalbumin/Creatinine Ratio, Urine; Future; Expected date: 10/08/2024  -     Comprehensive Metabolic Panel; Future; Expected date: 10/08/2024    3. Mixed hyperlipidemia  Overview:  Usually controlled     Assessment & Plan:  Recheck lipids in 6M    Orders:  -     Lipid Panel; Future; Expected date: 10/08/2024    4. Special screening for  malignant neoplasm of prostate  -     Prostate Specific Antigen, Diagnostic; Future; Expected date: 10/08/2024    5. Primary hypertension  Overview:  At goal today      Assessment & Plan:  Cont POC  Monitor              Plan:       1. Latent syphilis, unspecified as early or late  Overview:  Asymptomatic  S/p Rx with IM PCN for 3 weeks    Assessment & Plan:  Monitor RPR titers  Safe sex habits    Orders:  -     RPR (for monitoring); Future; Expected date: 10/08/2024    2. Essential hypertension, benign  -     Microalbumin/Creatinine Ratio, Urine; Future; Expected date: 10/08/2024  -     Comprehensive Metabolic Panel; Future; Expected date: 10/08/2024    3. Mixed hyperlipidemia  Overview:  Usually controlled     Assessment & Plan:  Recheck lipids in 6M    Orders:  -     Lipid Panel; Future; Expected date: 10/08/2024    4. Special screening for malignant neoplasm of prostate  -     Prostate Specific Antigen, Diagnostic; Future; Expected date: 10/08/2024    5. Primary hypertension  Overview:  At goal today      Assessment & Plan:  Cont POC  Monitor

## 2024-12-12 ENCOUNTER — LAB VISIT (OUTPATIENT)
Dept: LAB | Facility: HOSPITAL | Age: 64
End: 2024-12-12
Payer: COMMERCIAL

## 2024-12-12 DIAGNOSIS — G89.29 CHRONIC THORACIC BACK PAIN, UNSPECIFIED BACK PAIN LATERALITY: ICD-10-CM

## 2024-12-12 DIAGNOSIS — M54.6 CHRONIC THORACIC BACK PAIN, UNSPECIFIED BACK PAIN LATERALITY: ICD-10-CM

## 2024-12-12 DIAGNOSIS — R97.20 ELEVATED PSA: ICD-10-CM

## 2024-12-12 LAB
CREAT SERPL-MCNC: 1.1 MG/DL (ref 0.5–1.4)
EST. GFR  (NO RACE VARIABLE): >60 ML/MIN/1.73 M^2
PROSTATE SPECIFIC ANTIGEN, TOTAL: 4.6 NG/ML (ref 0–4)
PSA FREE MFR SERPL: 7.39 %
PSA FREE SERPL-MCNC: 0.34 NG/ML (ref 0–1.5)

## 2024-12-12 PROCEDURE — 84154 ASSAY OF PSA FREE: CPT | Performed by: UROLOGY

## 2024-12-12 PROCEDURE — 36415 COLL VENOUS BLD VENIPUNCTURE: CPT | Performed by: UROLOGY

## 2024-12-12 PROCEDURE — 82565 ASSAY OF CREATININE: CPT | Performed by: UROLOGY

## 2024-12-12 RX ORDER — MELOXICAM 15 MG/1
15 TABLET ORAL
Qty: 30 TABLET | Refills: 2 | Status: SHIPPED | OUTPATIENT
Start: 2024-12-12

## 2024-12-12 NOTE — TELEPHONE ENCOUNTER
Care Due:                  Date            Visit Type   Department     Provider  --------------------------------------------------------------------------------                                EP -                              PRIMARY      [facility]  Last Visit: 10-      CARE ([facility])   MEDICINE       Ziggy Bates  Next Visit: None Scheduled  None         None Found                                                            Last  Test          Frequency    Reason                     Performed    Due Date  --------------------------------------------------------------------------------    CBC.........  12 months..  meloxicam................  Not Found    Overdue    CMP.........  12 months..  atorvastatin, benazepriL,   02- 02-                             meloxicam................    Lipid Panel.  12 months..  atorvastatin.............  03- 03-    Health [facility] Embedded Care Due Messages. Reference number: [id number].   12/12/2024 7:53:29 AM CST

## 2024-12-12 NOTE — TELEPHONE ENCOUNTER
Refill Routing Note   Medication(s) are not appropriate for processing by [facility] for the following reason(s):        Outside of protocol    [facility] action(s):  Route   Requires labs : Yes             Appointments  past 12m or future 3m with PCP    Date Provider   Last Visit   10/8/2024 Ziggy Bates MD   Next Visit   Visit date not found Ziggy Bates MD   ED visits in past 90 days: 0        Note composed:9:17 AM 12/12/2024

## 2025-01-10 ENCOUNTER — TELEPHONE (OUTPATIENT)
Dept: RADIOLOGY | Facility: HOSPITAL | Age: 65
End: 2025-01-10

## 2025-01-11 ENCOUNTER — HOSPITAL ENCOUNTER (OUTPATIENT)
Dept: RADIOLOGY | Facility: HOSPITAL | Age: 65
Discharge: HOME | End: 2025-01-11
Attending: UROLOGY
Payer: COMMERCIAL

## 2025-01-11 DIAGNOSIS — R97.20 ELEVATED PSA: ICD-10-CM

## 2025-01-11 PROCEDURE — 72197 MRI PELVIS W/O & W/DYE: CPT | Mod: TC

## 2025-01-11 PROCEDURE — A9585 GADOBUTROL INJECTION: HCPCS | Performed by: UROLOGY

## 2025-01-11 PROCEDURE — 72197 MRI PELVIS W/O & W/DYE: CPT | Mod: 26,,, | Performed by: RADIOLOGY

## 2025-01-11 PROCEDURE — 25500020 PHARM REV CODE 255: Performed by: UROLOGY

## 2025-01-11 RX ORDER — GADOBUTROL 604.72 MG/ML
8 INJECTION INTRAVENOUS
Status: COMPLETED | OUTPATIENT
Start: 2025-01-11 | End: 2025-01-11

## 2025-01-11 RX ADMIN — GADOBUTROL 8 ML: 604.72 INJECTION INTRAVENOUS at 11:01

## 2025-01-14 ENCOUNTER — LAB VISIT (OUTPATIENT)
Dept: LAB | Facility: HOSPITAL | Age: 65
End: 2025-01-14
Attending: NURSE PRACTITIONER
Payer: COMMERCIAL

## 2025-01-14 ENCOUNTER — TELEPHONE (OUTPATIENT)
Dept: UROLOGY | Facility: CLINIC | Age: 65
End: 2025-01-14
Payer: COMMERCIAL

## 2025-01-14 ENCOUNTER — OFFICE VISIT (OUTPATIENT)
Dept: UROLOGY | Facility: CLINIC | Age: 65
End: 2025-01-14
Payer: COMMERCIAL

## 2025-01-14 VITALS — HEIGHT: 70 IN | WEIGHT: 178.81 LBS | BODY MASS INDEX: 25.6 KG/M2

## 2025-01-14 DIAGNOSIS — R97.20 ELEVATED PSA: Primary | ICD-10-CM

## 2025-01-14 DIAGNOSIS — A53.0 LATENT SYPHILIS, UNSPECIFIED AS EARLY OR LATE: ICD-10-CM

## 2025-01-14 LAB
BACTERIA #/AREA URNS HPF: NORMAL /HPF
MICROSCOPIC COMMENT: NORMAL
POC RESIDUAL URINE VOLUME: 0 ML (ref 0–100)
RBC #/AREA URNS HPF: 2 /HPF (ref 0–4)
WBC #/AREA URNS HPF: 2 /HPF (ref 0–5)

## 2025-01-14 PROCEDURE — 99214 OFFICE O/P EST MOD 30 MIN: CPT | Mod: S$GLB,,, | Performed by: NURSE PRACTITIONER

## 2025-01-14 PROCEDURE — 99999 PR PBB SHADOW E&M-EST. PATIENT-LVL III: CPT | Mod: PBBFAC,,, | Performed by: NURSE PRACTITIONER

## 2025-01-14 PROCEDURE — 1160F RVW MEDS BY RX/DR IN RCRD: CPT | Mod: CPTII,S$GLB,, | Performed by: NURSE PRACTITIONER

## 2025-01-14 PROCEDURE — 81000 URINALYSIS NONAUTO W/SCOPE: CPT | Performed by: NURSE PRACTITIONER

## 2025-01-14 PROCEDURE — 51798 US URINE CAPACITY MEASURE: CPT | Mod: S$GLB,,, | Performed by: NURSE PRACTITIONER

## 2025-01-14 PROCEDURE — 36415 COLL VENOUS BLD VENIPUNCTURE: CPT | Performed by: INTERNAL MEDICINE

## 2025-01-14 PROCEDURE — 1159F MED LIST DOCD IN RCRD: CPT | Mod: CPTII,S$GLB,, | Performed by: NURSE PRACTITIONER

## 2025-01-14 PROCEDURE — 87086 URINE CULTURE/COLONY COUNT: CPT | Performed by: NURSE PRACTITIONER

## 2025-01-14 PROCEDURE — 86593 SYPHILIS TEST NON-TREP QUANT: CPT | Performed by: INTERNAL MEDICINE

## 2025-01-14 PROCEDURE — 86592 SYPHILIS TEST NON-TREP QUAL: CPT | Performed by: INTERNAL MEDICINE

## 2025-01-14 PROCEDURE — 3008F BODY MASS INDEX DOCD: CPT | Mod: CPTII,S$GLB,, | Performed by: NURSE PRACTITIONER

## 2025-01-14 RX ORDER — GENTAMICIN 40 MG/ML
80 INJECTION, SOLUTION INTRAMUSCULAR; INTRAVENOUS ONCE
OUTPATIENT
Start: 2025-01-14 | End: 2025-01-14

## 2025-01-14 RX ORDER — CIPROFLOXACIN 500 MG/1
500 TABLET ORAL EVERY 12 HOURS
Qty: 6 TABLET | Refills: 0 | Status: SHIPPED | OUTPATIENT
Start: 2025-02-17 | End: 2025-02-20

## 2025-01-14 NOTE — PROGRESS NOTES
Procedure Order to Urology [[id number]]    Electronically signed by: Lakesha Lance NP on 01/14/25 1419 Status: Active   Ordering user: Lakesha Lance NP 01/14/25 1419 Authorized by: Lakesha Lance NP   Ordering mode: Standard   Frequency:  01/14/25 -     Diagnoses  Elevated PSA [R97.20]   Questionnaire    Question Answer   Procedure Prostate Biopsy Comment - 2/18/25 Uronav Pérez   Facility Name: [facility]   Order comments: Uronav   ASC, please order local sedation, UA poct   preop IM Gentamyacin 80mg,160mg if over 300 lbs (no urine Dr rondon or Claudia)

## 2025-01-14 NOTE — PROGRESS NOTES
[facility]  Group: Reyna/Pérez/Claudia/[place]  NPs: Ashlie Valdes/Lakesha Lance    Note today written by:Lakesha Lance  Date of Service: 2025    CHIEF COMPLAINT: Elevated PSA    PRESENTING ILLNESS:    Sandro Malone is a 64 y.o. male who presents for elevated PSA. Last clinic visit was 24 with Dr Ortez    Last seen by pcp 3/5/24 noting fu/med refill visit managing HTN, HLD, Gout and eval of chronic back pain for which mobic Rxed and he is in PT for  As well, routine screening labs noted elevated psa. Labs 24 had PSA 4.3 (prev 3.39 on 22), uric acid 7.7, Cr 1.1, eGFR >60     He presents today noting.  No voiding difficulty. AUA SS: 1/2 (1: sleeping) But does drink a lot of water and a lot of tea and occ few beers  No known family history of prostate cancer. Nor breast or ovarian cancer  No history of urinary tract infection or prostate infection.  No dysuria or hematuria.  No perirectal, perineal, testicular or ejaculatory pain  Prev . Prior intermediate accident in last yr, PT as above.   UTD on cscope 2 yrs ago  Occ lump gluteal cheeks squeeze and disappear no pus or smell, not on rectum     [employer]    25 PSA total and free 4.6 / 7.39 and creatinine 1.1 / GFR >60  25 MRI prostate: 1. Nonenlarged prostate gland, with 2 peripheral zone lesions compatible with PI-RADS 4 and PI-RADS 3 lesions.  No evidence for extraprostatic disease.  Overall Assessment: PI-RADS 4 - High (clinically significant cancer is likely to be present)    AUA SS: Feeling of incomplete Emptyin, Frequency: 0, Intermittency: 0, Urgency: 0, Weak Stream: 0, Strainin, Sleepin, Total SS: 1 , Quality of Life: 1  Not on BPH medication  PVR 0 ml  Denies dysuria, gross hematuria, flank pain, fever, chills, nausea or vomiting      9/3/24 RPR reactive, followed by PCP. Treated with IM PCN for 3 weeks per PCP note.  No  repeat lab completed    REVIEW OF SYSTEMS: as stated above in hpi    PATIENT HISTORY:    Past Medical History:   Diagnosis Date    Arthritis     Gout, unspecified     Hyperlipidemia     Hypertension        Past Surgical History:   Procedure Laterality Date    COLONOSCOPY N/A 03/19/2015    repeat in 10 yrs    stitches Bilateral 10/2023    elbows         PHYSICAL EXAMINATION:  Constitutional: He is oriented to person, place, and time. He appears well-developed and well-nourished.  He is in no apparent distress.  Abdominal:  He exhibits no distension.  There is no CVA tenderness.   Neurological: He is alert and oriented to person, place, and time.   Psych: Cooperative with normal affect.      Physical Exam      LABS:      Lab Results   Component Value Date    PSADIAG 4.3 (H) 02/09/2024    PSATOTAL 4.6 (H) 12/12/2024    PSAFREE 0.34 12/12/2024    PSAFREEPCT 7.39 12/12/2024     Lab Results   Component Value Date    CREATININE 1.1 12/12/2024         IMPRESSION:    Encounter Diagnoses   Name Primary?    Elevated PSA Yes     PLAN:  -Reviewed MRI / PSA total and free results with pt  Pt agrees to proceed with prostate biopsy/Uronav with Dr Ortez 2/18/25  Orders placed to schedule  Dr Ortez's staff to send biopsy instructions via portal   Cipro sent to pharmacy to start 2/17/25 as instructed  Discussed side effects and indications for cipro. Prescription sent to the pharmacy. Pt verbalized understanding.    -RPR scheduled.    -F/u for prostate biospy      I encouraged him or any of his family members to call or email me with questions and/or concerns.      30 minutes of total time spent on the encounter, which includes face to face time and non-face to face time preparing to see the patient (eg, review of tests), Obtaining and/or reviewing separately obtained history, Documenting clinical information in the electronic or other health record, Independently interpreting results (not separately reported) and communicating  results to the patient/family/caregiver, or Care coordination (not separately reported).

## 2025-01-14 NOTE — PATIENT INSTRUCTIONS
Start cipro as instructed day prior to prostate biopsy. 2/17/25    Prostate biopsy scheduled 2/18/25, will receive call the day prior with arrival time.

## 2025-01-15 ENCOUNTER — PATIENT MESSAGE (OUTPATIENT)
Dept: FAMILY MEDICINE | Facility: CLINIC | Age: 65
End: 2025-01-15
Payer: COMMERCIAL

## 2025-01-15 LAB
RPR SER QL: REACTIVE
RPR SER-TITR: ABNORMAL {TITER}

## 2025-01-16 LAB — BACTERIA UR CULT: NO GROWTH

## 2025-01-17 ENCOUNTER — TELEPHONE (OUTPATIENT)
Dept: UROLOGY | Facility: CLINIC | Age: 65
End: 2025-01-17
Payer: COMMERCIAL

## 2025-01-17 ENCOUNTER — TELEPHONE (OUTPATIENT)
Dept: FAMILY MEDICINE | Facility: CLINIC | Age: 65
End: 2025-01-17
Payer: COMMERCIAL

## 2025-01-17 DIAGNOSIS — A53.9 SYPHILIS: Primary | ICD-10-CM

## 2025-01-17 NOTE — TELEPHONE ENCOUNTER
From: Mariano Ortez MD   Sent: 1/16/2025   8:45 PM CST   To: Torres Lance NP; *   Subject: RE: RPR +                                        Given syphilis  tract colonization, prostate biopsy sepsis risk or other infectious complications related to syphillis active infection seen greater than benefit of prostate cancer diagnostics at this time.     I looped in Dr Massey with ID to get thoughts and see if can assist in plans     Would let the pt know we have mri, and will proceed with this once addressed (as prev noted) and would place bippsy on hold for now, and as a backup set a routine 3 month fu with torres so he is not lost to f/u - and if treated and cleared to proceed prior, will rebook and adjust f/u to review results in interim     Pt informed vu   Appt scheduled

## 2025-01-17 NOTE — TELEPHONE ENCOUNTER
Called and  offered appointment for today to go over lab results.  Patient declined.  Appointment scheduled with PCP for Tuesday afternoon 01/21/2025.  Asked patient to change to virtual if he has to due to weather.

## 2025-01-17 NOTE — TELEPHONE ENCOUNTER
----- Message from Ziggy Bates MD sent at 1/15/2025  5:24 PM CST -----  Regarding: f/u  Pls sched a f/u  Ty  ----- Message -----  From: [employer]  Sent: 1/15/2025  11:57 AM CST  To: Ziggy Bates MD

## 2025-01-19 ENCOUNTER — PATIENT MESSAGE (OUTPATIENT)
Dept: FAMILY MEDICINE | Facility: CLINIC | Age: 65
End: 2025-01-19
Payer: COMMERCIAL

## 2025-01-21 ENCOUNTER — OFFICE VISIT (OUTPATIENT)
Dept: FAMILY MEDICINE | Facility: CLINIC | Age: 65
End: 2025-01-21
Payer: COMMERCIAL

## 2025-01-21 DIAGNOSIS — I10 ESSENTIAL HYPERTENSION, BENIGN: ICD-10-CM

## 2025-01-21 DIAGNOSIS — G62.9 NEUROPATHY: ICD-10-CM

## 2025-01-21 DIAGNOSIS — Z92.89 HISTORY OF RPR TEST: Primary | ICD-10-CM

## 2025-01-21 DIAGNOSIS — E78.5 HYPERLIPIDEMIA, UNSPECIFIED HYPERLIPIDEMIA TYPE: ICD-10-CM

## 2025-01-21 DIAGNOSIS — E78.2 MIXED HYPERLIPIDEMIA: ICD-10-CM

## 2025-01-21 NOTE — PROGRESS NOTES
Subjective:       Patient ID: Sandro Malone is a 64 y.o. male.    Chief Complaint: Follow-up      History of Present Illness              Review of Systems      Objective:      Physical Exam    Assessment:       1. History of RPR test  Overview:  Asymptomatic  S/p 3 IM inj of PCN G    Assessment & Plan:  Get another level 6 M from end of Rx : around end of May  See ID as sched before prostate bx        2. Neuropathy  Overview:  Improving  Affecting R side of body    Assessment & Plan:  He took himself off gabapentin to avoid any possible AE  Monitor         3. Hyperlipidemia, unspecified hyperlipidemia type    4. Mixed hyperlipidemia  Overview:  Usually controlled     Orders:  -     Lipid Panel; Future; Expected date: 01/21/2025    5. Essential hypertension, benign  -     Microalbumin/Creatinine Ratio, Urine; Future; Expected date: 01/21/2025  -     Comprehensive Metabolic Panel; Future; Expected date: 01/21/2025           Plan:       Assessment & Plan            Sandro was seen today for follow-up.    Diagnoses and all orders for this visit:    History of RPR test    Neuropathy    Hyperlipidemia, unspecified hyperlipidemia type    Mixed hyperlipidemia  -     Lipid Panel; Future    Essential hypertension, benign  -     Microalbumin/Creatinine Ratio, Urine; Future  -     Comprehensive Metabolic Panel; Future

## 2025-01-21 NOTE — PROGRESS NOTES
Subjective:       Patient ID: Sandro Malone is a 64 y.o. male.    Chief Complaint: Follow-up      The patient location is: [place]  The chief complaint leading to consultation is: follow up,neuropathy, labs review  Visit type: audiovisual  Face to Face time with patient: 25  35 minutes = total time spent on the encounter, which includes face to face time and non-face to face time preparing to see the patient (eg, review of tests), Obtaining and/or reviewing separately obtained history, Docum clinical info in the electronic or other health record, Indep interpreting results and communicating results to patient                    Follow-up  This is a chronic problem. The current episode started more than 1 month ago. The problem occurs intermittently. The problem has been gradually improving. Associated symptoms include numbness. Pertinent negatives include no fever. Associated symptoms comments: Tingling,numbness of R side of body. Nothing aggravates the symptoms. Treatments tried: Gabapentin. The treatment provided moderate relief.     Review of Systems   Constitutional:  Negative for activity change, appetite change and fever.   Respiratory: Negative.     Cardiovascular: Negative.    Gastrointestinal: Negative.    Musculoskeletal: Negative.    Neurological:  Positive for numbness.        Tingling of R side of body         Objective:      Physical Exam  deferred b/o Tele-visit  Assessment:       1. History of RPR test  Overview:  Asymptomatic  S/p 3 IM inj of PCN G    Assessment & Plan:  Get another level 6 M from end of Rx : around end of May  See ID as sched before prostate bx        2. Neuropathy  Overview:  Improving  Affecting R side of body    Assessment & Plan:  He took himself off gabapentin to avoid any possible AE  Monitor         3. Hyperlipidemia, unspecified hyperlipidemia type    4. Mixed hyperlipidemia  Overview:  Usually controlled     Orders:  -     Lipid Panel; Future; Expected date:  01/21/2025    5. Essential hypertension, benign  -     Microalbumin/Creatinine Ratio, Urine; Future; Expected date: 01/21/2025  -     Comprehensive Metabolic Panel; Future; Expected date: 01/21/2025           Plan:       1. History of RPR test  Overview:  Asymptomatic  S/p 3 IM inj of PCN G    Assessment & Plan:  Get another level 6 M from end of Rx : around end of May  See ID as sched before prostate bx        2. Neuropathy  Overview:  Improving  Affecting R side of body    Assessment & Plan:  He took himself off gabapentin to avoid any possible AE  Monitor         3. Hyperlipidemia, unspecified hyperlipidemia type    4. Mixed hyperlipidemia  Overview:  Usually controlled     Orders:  -     Lipid Panel; Future; Expected date: 01/21/2025    5. Essential hypertension, benign  -     Microalbumin/Creatinine Ratio, Urine; Future; Expected date: 01/21/2025  -     Comprehensive Metabolic Panel; Future; Expected date: 01/21/2025          Visit today included increased complexity associated with the care of the episodic problem.   I addressed and managing the longitudinal care of the patient due to the serious and/or complex managed problem(s).  .

## 2025-01-28 ENCOUNTER — LAB VISIT (OUTPATIENT)
Dept: LAB | Facility: CLINIC | Age: 65
End: 2025-01-28
Payer: COMMERCIAL

## 2025-01-28 DIAGNOSIS — I10 ESSENTIAL HYPERTENSION, BENIGN: ICD-10-CM

## 2025-01-28 DIAGNOSIS — E78.2 MIXED HYPERLIPIDEMIA: ICD-10-CM

## 2025-01-28 LAB
ALBUMIN SERPL BCP-MCNC: 3.7 G/DL (ref 3.5–5.2)
ALP SERPL-CCNC: 73 U/L (ref 40–150)
ALT SERPL W/O P-5'-P-CCNC: 30 U/L (ref 10–44)
ANION GAP SERPL CALC-SCNC: 9 MMOL/L (ref 8–16)
AST SERPL-CCNC: 24 U/L (ref 10–40)
BILIRUB SERPL-MCNC: 0.9 MG/DL (ref 0.1–1)
BUN SERPL-MCNC: 18 MG/DL (ref 8–23)
CALCIUM SERPL-MCNC: 8.8 MG/DL (ref 8.7–10.5)
CHLORIDE SERPL-SCNC: 107 MMOL/L (ref 95–110)
CHOLEST SERPL-MCNC: 193 MG/DL (ref 120–199)
CHOLEST/HDLC SERPL: 2.6 {RATIO} (ref 2–5)
CO2 SERPL-SCNC: 23 MMOL/L (ref 23–29)
CREAT SERPL-MCNC: 1.3 MG/DL (ref 0.5–1.4)
EST. GFR  (NO RACE VARIABLE): >60 ML/MIN/1.73 M^2
GLUCOSE SERPL-MCNC: 92 MG/DL (ref 70–110)
HDLC SERPL-MCNC: 73 MG/DL (ref 40–75)
HDLC SERPL: 37.8 % (ref 20–50)
LDLC SERPL CALC-MCNC: 102 MG/DL (ref 63–159)
NONHDLC SERPL-MCNC: 120 MG/DL
POTASSIUM SERPL-SCNC: 4 MMOL/L (ref 3.5–5.1)
PROT SERPL-MCNC: 6.7 G/DL (ref 6–8.4)
SODIUM SERPL-SCNC: 139 MMOL/L (ref 136–145)
TRIGL SERPL-MCNC: 90 MG/DL (ref 30–150)

## 2025-01-28 PROCEDURE — 36415 COLL VENOUS BLD VENIPUNCTURE: CPT | Mod: ,,, | Performed by: INTERNAL MEDICINE

## 2025-01-28 PROCEDURE — 80061 LIPID PANEL: CPT | Performed by: INTERNAL MEDICINE

## 2025-01-28 PROCEDURE — 80053 COMPREHEN METABOLIC PANEL: CPT | Performed by: INTERNAL MEDICINE

## 2025-02-03 ENCOUNTER — OFFICE VISIT (OUTPATIENT)
Dept: INFECTIOUS DISEASES | Facility: CLINIC | Age: 65
End: 2025-02-03
Payer: COMMERCIAL

## 2025-02-03 ENCOUNTER — LAB VISIT (OUTPATIENT)
Dept: LAB | Facility: HOSPITAL | Age: 65
End: 2025-02-03
Attending: STUDENT IN AN ORGANIZED HEALTH CARE EDUCATION/TRAINING PROGRAM
Payer: COMMERCIAL

## 2025-02-03 VITALS
BODY MASS INDEX: 25.84 KG/M2 | HEART RATE: 103 BPM | DIASTOLIC BLOOD PRESSURE: 80 MMHG | WEIGHT: 180.5 LBS | SYSTOLIC BLOOD PRESSURE: 140 MMHG | TEMPERATURE: 97 F | OXYGEN SATURATION: 98 % | HEIGHT: 70 IN

## 2025-02-03 DIAGNOSIS — A53.9 SYPHILIS: Primary | ICD-10-CM

## 2025-02-03 DIAGNOSIS — A53.9 SYPHILIS, UNSPECIFIED: Primary | ICD-10-CM

## 2025-02-03 PROCEDURE — 3077F SYST BP >= 140 MM HG: CPT | Mod: CPTII,S$GLB,, | Performed by: STUDENT IN AN ORGANIZED HEALTH CARE EDUCATION/TRAINING PROGRAM

## 2025-02-03 PROCEDURE — 1159F MED LIST DOCD IN RCRD: CPT | Mod: CPTII,S$GLB,, | Performed by: STUDENT IN AN ORGANIZED HEALTH CARE EDUCATION/TRAINING PROGRAM

## 2025-02-03 PROCEDURE — 99204 OFFICE O/P NEW MOD 45 MIN: CPT | Mod: S$GLB,,, | Performed by: STUDENT IN AN ORGANIZED HEALTH CARE EDUCATION/TRAINING PROGRAM

## 2025-02-03 PROCEDURE — 87591 N.GONORRHOEAE DNA AMP PROB: CPT | Performed by: STUDENT IN AN ORGANIZED HEALTH CARE EDUCATION/TRAINING PROGRAM

## 2025-02-03 PROCEDURE — 86592 SYPHILIS TEST NON-TREP QUAL: CPT | Performed by: STUDENT IN AN ORGANIZED HEALTH CARE EDUCATION/TRAINING PROGRAM

## 2025-02-03 PROCEDURE — 36415 COLL VENOUS BLD VENIPUNCTURE: CPT | Performed by: STUDENT IN AN ORGANIZED HEALTH CARE EDUCATION/TRAINING PROGRAM

## 2025-02-03 PROCEDURE — 3008F BODY MASS INDEX DOCD: CPT | Mod: CPTII,S$GLB,, | Performed by: STUDENT IN AN ORGANIZED HEALTH CARE EDUCATION/TRAINING PROGRAM

## 2025-02-03 PROCEDURE — 3079F DIAST BP 80-89 MM HG: CPT | Mod: CPTII,S$GLB,, | Performed by: STUDENT IN AN ORGANIZED HEALTH CARE EDUCATION/TRAINING PROGRAM

## 2025-02-03 PROCEDURE — 86593 SYPHILIS TEST NON-TREP QUANT: CPT | Performed by: STUDENT IN AN ORGANIZED HEALTH CARE EDUCATION/TRAINING PROGRAM

## 2025-02-03 NOTE — PROGRESS NOTES
[facility] - [facility]ious Diseases   Department of Infectious Disease  Office Visit Note        PATIENT NAME: Sandro Malone  YOB: 1960   MRN: [id number]  DATE OF VISIT: 02/03/2025    REASON FOR VISIT: Referral (New Patient for Syphilis from Lakesha Lance , Urology )        HISTORY OF PRESENT ILLNESS     Sandro Malone is a 64 y.o. male very pleasant, with past medical history of HTN, HLD, gout who was referred by Urology Clinic for positive syphilis test.  Patient mentions he had a sexual encounter back in January of 2024 in [place] and did not know status of partner.  He tested positive 1:16 dilution, he was contacted by the Department of Health in [place] and received 3 doses of penicillin weekly for latent syphilis in April 2024.  Patient has been followed by Urology for elevated PSA, has plans for prostate biopsy, repeat RPR 1:8 dilutions.  He was referred for further evaluation.    He denies any fever or chills, no night sweats, no cough or shortness of breath, no chest pain, no nausea or vomiting, no abdominal pain, no dysuria increased urinary frequency, no change in bowel movements.  Of note, he had not noted any type of rash on his back or chest, palms or soles or any other area including genital area.    Outdoor activities:  Nonsmoker, occasional alcohol, no drugs.  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  Travel:  [facility] January 20, 2024.  Lives in [facility].  Implants:  None  Antibiotic history:  Penicillin weekly shots x3 in April 20, 2024    Social History  Marital Status: Single - sexually active, last encounter February 20, 2024  Alcohol History:  reports current alcohol use.  Tobacco History:  reports that he has never smoked. He has never used smokeless tobacco.  Drug History:  reports no history of drug use.      INTERVAL HISTORY     Referred by Urology office for positive RPR    ALLERGIES AND CURRENT MEDICATIONS     Review of  patient's allergies indicates:  No Known Allergies    Current Outpatient Medications   Medication Sig Dispense Refill    albuterol (PROVENTIL/VENTOLIN HFA) 90 mcg/actuation inhaler Inhale 2 puffs every 4-6 hours by inhalation route as needed for 30 days.      amLODIPine (NORVASC) 5 MG tablet Take 5 mg by mouth.      atorvastatin (LIPITOR) 20 MG tablet Take 1 tablet (20 mg total) by mouth every evening. 90 tablet 3    benazepriL (LOTENSIN) 20 MG tablet Take 1 tablet (20 mg total) by mouth once daily. 90 tablet 3    ergocalciferol (ERGOCALCIFEROL) 50,000 unit Cap Take 1 capsule by mouth every 7 days.      meloxicam (MOBIC) 15 MG tablet Take 1 tablet (15 mg total) by mouth as needed for Pain. 30 tablet 2    [START ON 2/17/2025] ciprofloxacin HCl (CIPRO) 500 MG tablet Take 1 tablet (500 mg total) by mouth every 12 (twelve) hours. for 3 days (Patient not taking: Reported on 2/3/2025) 6 tablet 0     Current Facility-Administered Medications   Medication Dose Route Frequency Provider Last Rate Last Admin    gentamicin injection 80 mg  80 mg Intramuscular Q12H Mariano Ortez MD           MEDICAL/SURGICAL/FAMILY HISTORY     Past Surgical History:   Procedure Laterality Date    COLONOSCOPY N/A 03/19/2015    repeat in 10 yrs    stitches Bilateral 10/2023    elbows     Past Medical History:   Diagnosis Date    Arthritis     Gout, unspecified     Hyperlipidemia     Hypertension      No family history on file.     REVIEW OF SYSTEMS     Review of Systems  Constitutional:  Denies fevers, chills, night sweats, loss of appetite.  HEENT: Denies visual changes,ear pain, sinus congestion, mouth pain or trouble swallowing, sore throat or  dental pain.  Neck: Denies neck pain or lumps.  Respiratory: Denies shortness of breath, coughing, wheezing or hemoptysis.  Cardiovascular:  Denies chest pain, palpitations or edema.  Gastrointestinal: Denies  nausea, vomiting, constipation or diarrhea.  Genitourinary:  Denies dysuria, frequency,  "urgency or hematuria   Musculoskeletal:  Denies joint pain or swelling, difficulty walking.    Skin:  Denies rash or itching.  Neurologic:  Denies motor or sensory loss, headaches or dizziness.    Psychiatric:  Denies changes in mood or behavior.      PHYSICAL EXAM     Vital Signs  Wt Readings from Last 3 Encounters:   02/03/25 81.9 kg (180 lb 8 oz)   01/14/25 81.1 kg (178 lb 12.7 oz)   10/08/24 81.1 kg (178 lb 14.4 oz)     Temp Readings from Last 3 Encounters:   02/03/25 97.3 °F (36.3 °C) (Temporal)   08/07/23 98.4 °F (36.9 °C) (Oral)     BP Readings from Last 3 Encounters:   02/03/25 (!) 140/80   10/08/24 118/76   04/08/24 126/64     Pulse Readings from Last 3 Encounters:   02/03/25 103   10/08/24 76   04/08/24 98       Physical Exam  General:  Very pleasant male, sitting in the chair, breathing comfortable on room air  Eyes: Eyes with no icterus or injection. Vision grossly normal  Ears: Hearing grossly normal.  Nose: Nares patent  Mouth: Moist mucous membranes, dentition is good. No ulcerations, erythema or exudates.  Neck: Supple, no tenderness to palpation.  Cardiovascular: Regular rate and rhythm, no murmurs, no edema.    Respiratory:  Clear to auscultation bilaterally, no tachypnea or increased work of breathing.  Gastrointestinal:  Soft with active bowel sounds, no tenderness to palpation, no distention.  Genitourinary:  No suprapubic tenderness.  Musculoskeletal:  Moves all extremities with equal strength.    Skin:  Warm and dry, no obvious rashes.  No evidence of rash on palms or soles, chest and back are clear.  Neuro:   Oriented, conversant, follows commands.  Psych: Good mood, normal affect.      WOUND     N/A    VASCULAR ACCESS DEVICE     N/A    LABS AND DIAGNOSTICS       Significant Labs: I have reviewed all relevant and available labs and microbiology. .    CBC LAST 7 DAYS  No results found for: "WBC", "RBC", "HGB", "HCT", "MCV", "MCH", "MCHC", "RDW", "PLT", "MPV", "GRAN", "LYMPH", "MONO", "EOS", " ""BASO", "EOSINOPHIL", "BASOPHIL"      CHEMISTRY LAST 7 DAYS  CMP  Sodium   Date Value Ref Range Status   01/28/2025 139 136 - 145 mmol/L Final     Potassium   Date Value Ref Range Status   01/28/2025 4.0 3.5 - 5.1 mmol/L Final     Chloride   Date Value Ref Range Status   01/28/2025 107 95 - 110 mmol/L Final     CO2   Date Value Ref Range Status   01/28/2025 23 23 - 29 mmol/L Final     Glucose   Date Value Ref Range Status   01/28/2025 92 70 - 110 mg/dL Final     BUN   Date Value Ref Range Status   01/28/2025 18 8 - 23 mg/dL Final     Creatinine   Date Value Ref Range Status   01/28/2025 1.3 0.5 - 1.4 mg/dL Final     Calcium   Date Value Ref Range Status   01/28/2025 8.8 8.7 - 10.5 mg/dL Final     Total Protein   Date Value Ref Range Status   01/28/2025 6.7 6.0 - 8.4 g/dL Final     Albumin   Date Value Ref Range Status   01/28/2025 3.7 3.5 - 5.2 g/dL Final     Total Bilirubin   Date Value Ref Range Status   01/28/2025 0.9 0.1 - 1.0 mg/dL Final     Comment:     For infants and newborns, interpretation of results should be based  on gestational age, weight and in agreement with clinical  observations.    Premature Infant recommended reference ranges:  Up to 24 hours.............<8.0 mg/dL  Up to 48 hours............<12.0 mg/dL  3-5 days..................<15.0 mg/dL  6-29 days.................<15.0 mg/dL       Alkaline Phosphatase   Date Value Ref Range Status   01/28/2025 73 40 - 150 U/L Final     AST   Date Value Ref Range Status   01/28/2025 24 10 - 40 U/L Final     ALT   Date Value Ref Range Status   01/28/2025 30 10 - 44 U/L Final     Anion Gap   Date Value Ref Range Status   01/28/2025 9 8 - 16 mmol/L Final     eGFR   Date Value Ref Range Status   01/28/2025 >60.0 >60 mL/min/1.73 m^2 Final       Estimated Creatinine Clearance: 60.8 mL/min (based on SCr of 1.3 mg/dL).    INFLAMMATORY/PROCAL  LAST 7 DAYS  @LABRCNTIP[PROCAL:7, ESR:7, CRP:7@  No results found for: "ESR"  No results found for: "CRP"    PRIOR  " MICROBIOLOGY:    No results found for the last 90 days.      LAST 7 DAYS MICROBIOLOGY   Microbiology Results (last 7 days)       ** No results found for the last 168 hours. **            PATHOLOGY  Specimens (From admission, onward)      None            CURRENT/PREVIOUS VISIT EKG  No results found for this or any previous visit.    Significant Diagnostics: I have reviewed all relevant and available diagnostic results.       Latest Reference Range & Units 02/09/24 10:42 04/08/24 16:03 01/14/25 14:45   Hep B S Ab mIU/mL  -   <3.00  Non-reactive    Hepatitis C Ab Non-reactive  Non-reactive     HIV 1/2 Ag/Ab Non-reactive  Non-reactive     RPR   Reactive ! Reactive !   RPR Quantitative   1:16 ! 1:8 !   !: Data is abnormal    ASSESSMENT AND PLAN:     History of latent syphilis, initial RPR 1:16 status post 3 weekly doses of penicillin in April 20, 2024, repeat RPR 1:8  He received treatment for latent syphilis in April 20, 2024, it might have been too soon/early to repeat RPR since there is no fourfold drop on dilutions  Will repeat RPR at least a year after initial test, April/2025   GC/chlamydia today   Prior hep B, hep C, HIV 4th Gen February and April 20, 2024 non-reactive  All questions answered  RTC 4/23 repeat RPR      PMHx: HTN, HLD, gout    Syphilis  -     Ambulatory referral/consult to Infectious Disease        RTC April 23rd      I spent a total of 30 minutes on the day of the visit.  This includes face to face time and non-face to face time preparing to see the patient (eg, review of tests), obtaining and/or reviewing separately obtained history, documenting clinical information in the electronic or other health record, independently interpreting results and communicating results to the patient/family/caregiver, or care coordinator.      Bertha Yoon MD  Date of Service: 02/03/2025      This note was created using [employer]  voice recognition software that occasionally misinterpreted phrases or  words.

## 2025-02-04 LAB
RPR SER QL: REACTIVE
RPR SER-TITR: ABNORMAL {TITER}

## 2025-02-05 NOTE — PROGRESS NOTES
I spoke with patient to advise   He needs to repeat the RPR lab  Test in April 2025 ; per Dr Massey 's   Orders  Patient stated he remembers her   Discussing this at the visit   [facility]  2/05/2025

## 2025-02-06 LAB
CHLAMYDIA, AMPLIFIED DNA: NEGATIVE
N GONORRHOEAE, AMPLIFIED DNA: NEGATIVE

## 2025-02-12 ENCOUNTER — TELEPHONE (OUTPATIENT)
Dept: INFECTIOUS DISEASES | Facility: HOSPITAL | Age: 65
End: 2025-02-12

## 2025-02-12 DIAGNOSIS — A53.9 SYPHILIS: Primary | ICD-10-CM

## 2025-02-27 RX ORDER — BENAZEPRIL HYDROCHLORIDE 20 MG/1
TABLET ORAL
Qty: 90 TABLET | Refills: 3 | Status: SHIPPED | OUTPATIENT
Start: 2025-02-27

## 2025-02-27 RX ORDER — ATORVASTATIN CALCIUM 20 MG/1
20 TABLET, FILM COATED ORAL NIGHTLY
Qty: 90 TABLET | Refills: 3 | Status: SHIPPED | OUTPATIENT
Start: 2025-02-27

## 2025-02-27 NOTE — TELEPHONE ENCOUNTER
Refill Routing Note   Medication(s) are not appropriate for processing by [facility] for the following reason(s):        Required vitals abnormal    [facility] action(s):  Approve  Defer   Requires labs : Yes             Appointments  past 12m or future 3m with PCP    Date Provider   Last Visit   1/21/2025 Ziggy Bates MD   Next Visit   Visit date not found Ziggy Bates MD   ED visits in past 90 days: 0        Note composed:9:41 AM 02/27/2025

## 2025-02-27 NOTE — TELEPHONE ENCOUNTER
Care Due:                  Date            Visit Type   Department     Provider  --------------------------------------------------------------------------------                                ESTABLISHED                              PATIENT -    [facility] FAMILY  Last Visit: 01-      [facility]       Ziggy Bates  Next Visit: None Scheduled  None         None Found                                                            Last  Test          Frequency    Reason                     Performed    Due Date  --------------------------------------------------------------------------------    CBC.........  12 months..  meloxicam................  Not Found    Overdue    [employer] Embedded Care Due Messages. Reference number: [id number].   2/27/2025 5:25:18 AM CST

## 2025-03-09 DIAGNOSIS — M54.6 CHRONIC THORACIC BACK PAIN, UNSPECIFIED BACK PAIN LATERALITY: ICD-10-CM

## 2025-03-09 DIAGNOSIS — G89.29 CHRONIC THORACIC BACK PAIN, UNSPECIFIED BACK PAIN LATERALITY: ICD-10-CM

## 2025-03-09 RX ORDER — MELOXICAM 15 MG/1
TABLET ORAL
Qty: 30 TABLET | Refills: 2 | Status: SHIPPED | OUTPATIENT
Start: 2025-03-09

## 2025-03-09 NOTE — TELEPHONE ENCOUNTER
No care due was identified.  [facility] Embedded Care Due Messages. Reference number: [id number].   3/09/2025 11:19:22 AM CDT

## 2025-03-09 NOTE — TELEPHONE ENCOUNTER
Refill Routing Note   Medication(s) are not appropriate for processing by [facility] for the following reason(s):      Medication outside of protocol    [facility] action(s):  Route Care Due:  None identified            Appointments  past 12m or future 3m with PCP    Date Provider   Last Visit   1/21/2025 Ziggy Bates MD   Next Visit   7/15/2025 Ziggy Bates MD   ED visits in past 90 days: 0        Note composed:2:26 PM 03/09/2025
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
## 2025-04-23 ENCOUNTER — LAB VISIT (OUTPATIENT)
Dept: LAB | Facility: HOSPITAL | Age: 65
End: 2025-04-23
Attending: STUDENT IN AN ORGANIZED HEALTH CARE EDUCATION/TRAINING PROGRAM
Payer: COMMERCIAL

## 2025-04-23 ENCOUNTER — OFFICE VISIT (OUTPATIENT)
Dept: INFECTIOUS DISEASES | Facility: CLINIC | Age: 65
End: 2025-04-23
Payer: COMMERCIAL

## 2025-04-23 VITALS
OXYGEN SATURATION: 97 % | BODY MASS INDEX: 25.83 KG/M2 | WEIGHT: 180 LBS | HEART RATE: 89 BPM | DIASTOLIC BLOOD PRESSURE: 80 MMHG | SYSTOLIC BLOOD PRESSURE: 143 MMHG

## 2025-04-23 DIAGNOSIS — A53.9 SYPHILIS: ICD-10-CM

## 2025-04-23 DIAGNOSIS — A53.9 SYPHILIS: Primary | ICD-10-CM

## 2025-04-23 PROCEDURE — 99214 OFFICE O/P EST MOD 30 MIN: CPT | Mod: S$GLB,,, | Performed by: STUDENT IN AN ORGANIZED HEALTH CARE EDUCATION/TRAINING PROGRAM

## 2025-04-23 PROCEDURE — 3008F BODY MASS INDEX DOCD: CPT | Mod: CPTII,S$GLB,, | Performed by: STUDENT IN AN ORGANIZED HEALTH CARE EDUCATION/TRAINING PROGRAM

## 2025-04-23 PROCEDURE — 3077F SYST BP >= 140 MM HG: CPT | Mod: CPTII,S$GLB,, | Performed by: STUDENT IN AN ORGANIZED HEALTH CARE EDUCATION/TRAINING PROGRAM

## 2025-04-23 PROCEDURE — 36415 COLL VENOUS BLD VENIPUNCTURE: CPT

## 2025-04-23 PROCEDURE — 3079F DIAST BP 80-89 MM HG: CPT | Mod: CPTII,S$GLB,, | Performed by: STUDENT IN AN ORGANIZED HEALTH CARE EDUCATION/TRAINING PROGRAM

## 2025-04-23 PROCEDURE — 86592 SYPHILIS TEST NON-TREP QUAL: CPT

## 2025-04-23 PROCEDURE — 4010F ACE/ARB THERAPY RXD/TAKEN: CPT | Mod: CPTII,S$GLB,, | Performed by: STUDENT IN AN ORGANIZED HEALTH CARE EDUCATION/TRAINING PROGRAM

## 2025-04-23 PROCEDURE — 1159F MED LIST DOCD IN RCRD: CPT | Mod: CPTII,S$GLB,, | Performed by: STUDENT IN AN ORGANIZED HEALTH CARE EDUCATION/TRAINING PROGRAM

## 2025-04-23 NOTE — PROGRESS NOTES
[facility] - Infectious Diseases   Department of Infectious Disease  Office Visit Note        PATIENT NAME: Sandro Malone  YOB: 1960   MRN: [id number]  DATE OF VISIT: 04/23/2025    REASON FOR VISIT: Follow-up (Follow up syphilis )        HISTORY OF PRESENT ILLNESS     Sandro Malone is a 64 y.o. male very pleasant, with past medical history of HTN, HLD, gout who was referred by Urology Clinic for positive syphilis test.  Patient mentions he had a sexual encounter back in January of 2024 in [place] and did not know status of partner.  He tested positive 1:16 dilution, he was contacted by the Department of Health in [place] and received 3 doses of penicillin weekly for latent syphilis in April 2024.  Patient has been followed by Urology for elevated PSA, has plans for prostate biopsy, repeat RPR 1:8 dilutions.  He was referred for further evaluation.    He denies any fever or chills, no night sweats, no cough or shortness of breath, no chest pain, no nausea or vomiting, no abdominal pain, no dysuria increased urinary frequency, no change in bowel movements.  Of note, he had not noted any type of rash on his back or chest, palms or soles or any other area including genital area.    Outdoor activities:  Nonsmoker, occasional alcohol, no drugs.  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  Travel:  [facility] [place] [facility] January 20, 2024.  Lives in [facility].  Implants:  None  Antibiotic history:  Penicillin weekly shots x3 in April 20, 2024    Social History  Marital Status: Single - sexually active, last encounter February 20, 2024  Alcohol History:  reports current alcohol use.  Tobacco History:  reports that he has never smoked. He has never used smokeless tobacco.  Drug History:  reports no history of drug use.      INTERVAL HISTORY     4/23:  Patient is here for follow-up.  Since last visit, unfortunately the RPR that was supposed to be done for this appointment  was drawn in February and was still positive one calling 16.  Patient will have blood work repeated today and we will do RPR quantitative with FTA antibodies IgM and IgG.  If we have lab work consistent with treatment of syphilis we will be able to give him an okay to have his urology procedure.  He mentions he does not have any constitutional symptoms, no fever chills, no night sweats, no cough or shortness of breath, no nausea or vomiting, no chest pain, no abdominal pain, no dysuria increased urinary frequency, no changes in bowel movements.  Of note, no new sexual partners, he mentions he has not been sexually active since he tested positive for syphilis in January.    2/3/24: Referred by Urology office for positive RPR    ALLERGIES AND CURRENT MEDICATIONS     Review of patient's allergies indicates:  No Known Allergies    Current Outpatient Medications   Medication Sig Dispense Refill    albuterol (PROVENTIL/VENTOLIN HFA) 90 mcg/actuation inhaler Inhale 2 puffs every 4-6 hours by inhalation route as needed for 30 days.      amLODIPine (NORVASC) 5 MG tablet Take 5 mg by mouth.      atorvastatin (LIPITOR) 20 MG tablet TAKE 1 TABLET(20 MG) BY MOUTH EVERY EVENING 90 tablet 3    benazepriL (LOTENSIN) 20 MG tablet TAKE 1 TABLET(20 MG) BY MOUTH DAILY 90 tablet 3    ergocalciferol (ERGOCALCIFEROL) 50,000 unit Cap Take 1 capsule by mouth every 7 days.      meloxicam (MOBIC) 15 MG tablet TAKE 1 TABLET BY MOUTH AS NEEDED FOR PAIN 30 tablet 2     Current Facility-Administered Medications   Medication Dose Route Frequency Provider Last Rate Last Admin    gentamicin injection 80 mg  80 mg Intramuscular Q12H Mariano Ortez MD           MEDICAL/SURGICAL/FAMILY HISTORY     Past Surgical History:   Procedure Laterality Date    COLONOSCOPY N/A 03/19/2015    repeat in 10 yrs    stitches Bilateral 10/2023    elbows     Past Medical History:   Diagnosis Date    Arthritis     Gout, unspecified     Hyperlipidemia     Hypertension       No family history on file.     REVIEW OF SYSTEMS     Review of Systems  Constitutional:  Denies fevers, chills, night sweats, loss of appetite.  HEENT: Denies visual changes,ear pain, sinus congestion, mouth pain or trouble swallowing, sore throat or  dental pain.  Neck: Denies neck pain or lumps.  Respiratory: Denies shortness of breath, coughing, wheezing or hemoptysis.  Cardiovascular:  Denies chest pain, palpitations or edema.  Gastrointestinal: Denies  nausea, vomiting, constipation or diarrhea.  Genitourinary:  Denies dysuria, frequency, urgency or hematuria   Musculoskeletal:  Denies joint pain or swelling, difficulty walking.    Skin:  Denies rash or itching.  Neurologic:  Denies motor or sensory loss, headaches or dizziness.    Psychiatric:  Denies changes in mood or behavior.      PHYSICAL EXAM     Vital Signs  Wt Readings from Last 3 Encounters:   04/23/25 81.6 kg (180 lb)   02/03/25 81.9 kg (180 lb 8 oz)   01/14/25 81.1 kg (178 lb 12.7 oz)     Temp Readings from Last 3 Encounters:   02/03/25 97.3 °F (36.3 °C) (Temporal)   08/07/23 98.4 °F (36.9 °C) (Oral)     BP Readings from Last 3 Encounters:   04/23/25 (!) 143/80   02/03/25 (!) 140/80   10/08/24 118/76     Pulse Readings from Last 3 Encounters:   04/23/25 89   02/03/25 103   10/08/24 76       Physical Exam  General:  Very pleasant male, sitting in the chair, breathing comfortable on room air  Eyes: Eyes with no icterus or injection. Vision grossly normal  Ears: Hearing grossly normal.  Nose: Nares patent  Mouth: Moist mucous membranes, dentition is good. No ulcerations, erythema or exudates.  Neck: Supple, no tenderness to palpation.  Cardiovascular: Regular rate and rhythm, no murmurs, no edema.    Respiratory:  Clear to auscultation bilaterally, no tachypnea or increased work of breathing.  Gastrointestinal:  Soft with active bowel sounds, no tenderness to palpation, no distention.  Genitourinary:  No suprapubic tenderness.  Musculoskeletal:   "Moves all extremities with equal strength.    Skin:  Warm and dry, no obvious rashes.  No evidence of rash on palms or soles, chest and back are clear.  Neuro:   Oriented, conversant, follows commands.  Psych: Good mood, normal affect.      WOUND     N/A    VASCULAR ACCESS DEVICE     N/A    LABS AND DIAGNOSTICS       Significant Labs: I have reviewed all relevant and available labs and microbiology. .    CBC LAST 7 DAYS  No results found for: "WBC", "RBC", "HGB", "HCT", "MCV", "MCH", "MCHC", "RDW", "PLT", "MPV", "GRAN", "LYMPH", "MONO", "EOS", "BASO", "EOSINOPHIL", "BASOPHIL"      CHEMISTRY LAST 7 DAYS  CMP  Sodium   Date Value Ref Range Status   01/28/2025 139 136 - 145 mmol/L Final     Potassium   Date Value Ref Range Status   01/28/2025 4.0 3.5 - 5.1 mmol/L Final     Chloride   Date Value Ref Range Status   01/28/2025 107 95 - 110 mmol/L Final     CO2   Date Value Ref Range Status   01/28/2025 23 23 - 29 mmol/L Final     Glucose   Date Value Ref Range Status   01/28/2025 92 70 - 110 mg/dL Final     BUN   Date Value Ref Range Status   01/28/2025 18 8 - 23 mg/dL Final     Creatinine   Date Value Ref Range Status   01/28/2025 1.3 0.5 - 1.4 mg/dL Final     Calcium   Date Value Ref Range Status   01/28/2025 8.8 8.7 - 10.5 mg/dL Final     Total Protein   Date Value Ref Range Status   01/28/2025 6.7 6.0 - 8.4 g/dL Final     Albumin   Date Value Ref Range Status   01/28/2025 3.7 3.5 - 5.2 g/dL Final     Total Bilirubin   Date Value Ref Range Status   01/28/2025 0.9 0.1 - 1.0 mg/dL Final     Comment:     For infants and newborns, interpretation of results should be based  on gestational age, weight and in agreement with clinical  observations.    Premature Infant recommended reference ranges:  Up to 24 hours.............<8.0 mg/dL  Up to 48 hours............<12.0 mg/dL  3-5 days..................<15.0 mg/dL  6-29 days.................<15.0 mg/dL       Alkaline Phosphatase   Date Value Ref Range Status   01/28/2025 73 40 - " "150 U/L Final     AST   Date Value Ref Range Status   01/28/2025 24 10 - 40 U/L Final     ALT   Date Value Ref Range Status   01/28/2025 30 10 - 44 U/L Final     Anion Gap   Date Value Ref Range Status   01/28/2025 9 8 - 16 mmol/L Final     eGFR   Date Value Ref Range Status   01/28/2025 >60.0 >60 mL/min/1.73 m^2 Final       CrCl cannot be calculated (Patient's most recent lab result is older than the maximum 7 days allowed.).    INFLAMMATORY/PROCAL  LAST 7 DAYS  @LABRCNTIP[PROCAL:7, ESR:7, CRP:7@  No results found for: "ESR"  No results found for: "CRP"    PRIOR  MICROBIOLOGY:    No results found for the last 90 days.      LAST 7 DAYS MICROBIOLOGY   Microbiology Results (last 7 days)       ** No results found for the last 168 hours. **            PATHOLOGY  Specimens (From admission, onward)      None            CURRENT/PREVIOUS VISIT EKG  No results found for this or any previous visit.    Significant Diagnostics: I have reviewed all relevant and available diagnostic results.       Latest Reference Range & Units 02/09/24 10:42 04/08/24 16:03 01/14/25 14:45 02/03/25 15:17 02/03/25 15:20   Hep B S Ab mIU/mL  -   <3.00  Non-reactive      Hepatitis C Ab Non-reactive  Non-reactive       HIV 1/2 Ag/Ab Non-reactive  Non-reactive       Chlamydia, Amplified DNA Negative      Negative   N gonorrhoeae, amplified DNA Negative      Negative   RPR   Reactive ! Reactive ! Reactive !    RPR Quantitative   1:16 ! 1:8 ! 1:16 !    !: Data is abnormal    ASSESSMENT AND PLAN:     History of latent syphilis, initial RPR 1:16 status post 3 weekly doses of penicillin in April 20, 2024, repeat RPR 1:8  He received treatment for latent syphilis in April 20, 2024, it might have been too soon/early to repeat RPR since there is no fourfold drop on dilutions  Latest RPR 2/3 1:16, prior 1:8  Will repeat RPR quantitative today and FTA IgM IgG syphilis antibodies  Prior hep B, hep C, HIV 4th Gen February and April 20, 2024 non-reactive  All " questions answered  RTC 1 month      Elevated PSA, follows with Urology outpatient  Has a appointment 4/29, awaiting serology for clearance for procedure    PMHx: HTN, HLD, gout    Syphilis  -     RPR, Quantitative; Future; Expected date: 04/23/2025  -     FTA antibodies, IgG and IgM; Future; Expected date: 04/23/2025        RTC in 1 month      I spent a total of 30 minutes on the day of the visit.  This includes face to face time and non-face to face time preparing to see the patient (eg, review of tests), obtaining and/or reviewing separately obtained history, documenting clinical information in the electronic or other health record, independently interpreting results and communicating results to the patient/family/caregiver, or care coordinator.      Bertha Yoon MD  Date of Service: 04/23/2025      This note was created using [employer]  voice recognition software that occasionally misinterpreted phrases or words.

## 2025-04-24 ENCOUNTER — TELEPHONE (OUTPATIENT)
Dept: INFECTIOUS DISEASES | Facility: CLINIC | Age: 65
End: 2025-04-24
Payer: COMMERCIAL

## 2025-04-24 ENCOUNTER — RESULTS FOLLOW-UP (OUTPATIENT)
Dept: INFECTIOUS DISEASES | Facility: CLINIC | Age: 65
End: 2025-04-24

## 2025-04-24 LAB — RPR SER-TITR: ABNORMAL TITER

## 2025-04-24 NOTE — TELEPHONE ENCOUNTER
Patient scheduled for Tuesday 4/29/25  with Dr Massey   For an office visit and first dose Bicillin La   Patient stated it has been a while since he had Bicillin La injections.  He was advised he needs to restart treatment   [facility]  [facility]    ===View-only below this line===  ----- Message -----  From: Bertha Hare MD  Sent: 4/24/2025  12:49 PM CDT  To: BEATRICE Bangura, we have to call him back and we have to schedule him again for weekly penicillin shots  ----- Message -----  From: Lab, Background User  Sent: 4/24/2025  11:41 AM CDT  To: Bertha Yoon MD                          ----- Message from Bertha Yoon MD sent at 4/24/2025 12:49 PM CDT -----  Emily, we have to call him back and we have to schedule him again for weekly penicillin shots  ----- Message -----  From: Lab, Background User  Sent: 4/24/2025  11:41 AM CDT  To: Bertha Yoon MD

## 2025-04-25 ENCOUNTER — PATIENT MESSAGE (OUTPATIENT)
Dept: ADMINISTRATIVE | Facility: HOSPITAL | Age: 65
End: 2025-04-25
Payer: COMMERCIAL

## 2025-04-29 ENCOUNTER — OFFICE VISIT (OUTPATIENT)
Dept: INFECTIOUS DISEASES | Facility: CLINIC | Age: 65
End: 2025-04-29
Payer: COMMERCIAL

## 2025-04-29 VITALS
SYSTOLIC BLOOD PRESSURE: 140 MMHG | WEIGHT: 183.63 LBS | OXYGEN SATURATION: 97 % | HEART RATE: 67 BPM | DIASTOLIC BLOOD PRESSURE: 74 MMHG | TEMPERATURE: 97 F | HEIGHT: 70 IN | BODY MASS INDEX: 26.29 KG/M2

## 2025-04-29 DIAGNOSIS — A53.9 SYPHILIS: ICD-10-CM

## 2025-04-29 DIAGNOSIS — A53.0 LATENT SYPHILIS, UNSPECIFIED AS EARLY OR LATE: Primary | ICD-10-CM

## 2025-04-29 DIAGNOSIS — I10 PRIMARY HYPERTENSION: ICD-10-CM

## 2025-04-29 PROCEDURE — 3077F SYST BP >= 140 MM HG: CPT | Mod: CPTII,S$GLB,, | Performed by: STUDENT IN AN ORGANIZED HEALTH CARE EDUCATION/TRAINING PROGRAM

## 2025-04-29 PROCEDURE — 1159F MED LIST DOCD IN RCRD: CPT | Mod: CPTII,S$GLB,, | Performed by: STUDENT IN AN ORGANIZED HEALTH CARE EDUCATION/TRAINING PROGRAM

## 2025-04-29 PROCEDURE — 3078F DIAST BP <80 MM HG: CPT | Mod: CPTII,S$GLB,, | Performed by: STUDENT IN AN ORGANIZED HEALTH CARE EDUCATION/TRAINING PROGRAM

## 2025-04-29 PROCEDURE — 96372 THER/PROPH/DIAG INJ SC/IM: CPT | Mod: S$GLB,,, | Performed by: STUDENT IN AN ORGANIZED HEALTH CARE EDUCATION/TRAINING PROGRAM

## 2025-04-29 PROCEDURE — 99214 OFFICE O/P EST MOD 30 MIN: CPT | Mod: 25,S$GLB,, | Performed by: STUDENT IN AN ORGANIZED HEALTH CARE EDUCATION/TRAINING PROGRAM

## 2025-04-29 PROCEDURE — 3008F BODY MASS INDEX DOCD: CPT | Mod: CPTII,S$GLB,, | Performed by: STUDENT IN AN ORGANIZED HEALTH CARE EDUCATION/TRAINING PROGRAM

## 2025-04-29 PROCEDURE — 4010F ACE/ARB THERAPY RXD/TAKEN: CPT | Mod: CPTII,S$GLB,, | Performed by: STUDENT IN AN ORGANIZED HEALTH CARE EDUCATION/TRAINING PROGRAM

## 2025-04-30 ENCOUNTER — TELEPHONE (OUTPATIENT)
Dept: UROLOGY | Facility: CLINIC | Age: 65
End: 2025-04-30
Payer: COMMERCIAL

## 2025-04-30 NOTE — TELEPHONE ENCOUNTER
----- Message from Bertha Yoon MD sent at 4/29/2025  9:16 PM CDT -----  Hi I saw him today, repeat RPR still positive 1:8   We will give him 3 shots of penicillin.  He canceled his appointment with you today because he had to come for shots.  I would say there is no contraindication for him to get the procedure after the 3 shots.  So I will give him ID clearance after his last shot in 2 weeks.

## 2025-04-30 NOTE — PROGRESS NOTES
[facility] - Infectious Diseases   Department of Infectious Disease  Office Visit Note        PATIENT NAME: Sandro Malone  YOB: 1960   MRN: [id number]  DATE OF VISIT: 04/29/2025    REASON FOR VISIT: Follow-up (Syphilis)        HISTORY OF PRESENT ILLNESS     Sandro Malone is a 64 y.o. male very pleasant, with past medical history of HTN, HLD, gout who was referred by Urology Clinic for positive syphilis test.  Patient mentions he had a sexual encounter back in January of 2024 in [place] and did not know status of partner.  He tested positive 1:16 dilution, he was contacted by the Department of Health in [place] and received 3 doses of penicillin weekly for latent syphilis in April 2024.  Patient has been followed by Urology for elevated PSA, has plans for prostate biopsy, repeat RPR 1:8 dilutions.  He was referred for further evaluation.    He denies any fever or chills, no night sweats, no cough or shortness of breath, no chest pain, no nausea or vomiting, no abdominal pain, no dysuria increased urinary frequency, no change in bowel movements.  Of note, he had not noted any type of rash on his back or chest, palms or soles or any other area including genital area.    Outdoor activities:  Nonsmoker, occasional alcohol, no drugs.  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  Travel:  [facility] [place] [facility] January 20, 2024.  Lives in [place].  Implants:  None  Antibiotic history:  Penicillin weekly shots x3 in April 20, 2024    Social History  Marital Status: Single - sexually active, last encounter February 20, 2024  Alcohol History:  reports current alcohol use.  Tobacco History:  reports that he has never smoked. He has never used smokeless tobacco.  Drug History:  reports no history of drug use.      INTERVAL HISTORY     4/29:  Patient is here for follow-up, repeat RPR from 4/23 still positive 1:8.  He denies having any skin lesions, no oral ulcers.  Overall,  he feels good.  He canceled the appointment with Urology review of needing penicillin treatment.  We will start penicillin series today weekly x3 doses.  We will contact Urology to have him reschedule appointment for procedure after he completes treatment.    4/23:  Patient is here for follow-up.  Since last visit, unfortunately the RPR that was supposed to be done for this appointment was drawn in February and was still positive one calling 16.  Patient will have blood work repeated today and we will do RPR quantitative with FTA antibodies IgM and IgG.  If we have lab work consistent with treatment of syphilis we will be able to give him an okay to have his urology procedure.  He mentions he does not have any constitutional symptoms, no fever chills, no night sweats, no cough or shortness of breath, no nausea or vomiting, no chest pain, no abdominal pain, no dysuria increased urinary frequency, no changes in bowel movements.  Of note, no new sexual partners, he mentions he has not been sexually active since he tested positive for syphilis in January.    2/3/24: Referred by Urology office for positive RPR    ALLERGIES AND CURRENT MEDICATIONS     Review of patient's allergies indicates:  No Known Allergies    Current Outpatient Medications   Medication Sig Dispense Refill    albuterol (PROVENTIL/VENTOLIN HFA) 90 mcg/actuation inhaler Inhale 2 puffs every 4-6 hours by inhalation route as needed for 30 days.      amLODIPine (NORVASC) 5 MG tablet Take 5 mg by mouth.      atorvastatin (LIPITOR) 20 MG tablet TAKE 1 TABLET(20 MG) BY MOUTH EVERY EVENING 90 tablet 3    benazepriL (LOTENSIN) 20 MG tablet TAKE 1 TABLET(20 MG) BY MOUTH DAILY 90 tablet 3    ergocalciferol (ERGOCALCIFEROL) 50,000 unit Cap Take 1 capsule by mouth every 7 days.      meloxicam (MOBIC) 15 MG tablet TAKE 1 TABLET BY MOUTH AS NEEDED FOR PAIN 30 tablet 2     Current Facility-Administered Medications   Medication Dose Route Frequency Provider Last Rate  Last Admin    gentamicin injection 80 mg  80 mg Intramuscular Q12H Mariano Ortez MD           MEDICAL/SURGICAL/FAMILY HISTORY     Past Surgical History:   Procedure Laterality Date    COLONOSCOPY N/A 03/19/2015    repeat in 10 yrs    stitches Bilateral 10/2023    elbows     Past Medical History:   Diagnosis Date    Arthritis     Gout, unspecified     Hyperlipidemia     Hypertension      No family history on file.     REVIEW OF SYSTEMS     Review of Systems  Constitutional:  Denies fevers, chills, night sweats, loss of appetite.  HEENT: Denies visual changes,ear pain, sinus congestion, mouth pain or trouble swallowing, sore throat or  dental pain.  Neck: Denies neck pain or lumps.  Respiratory: Denies shortness of breath, coughing, wheezing or hemoptysis.  Cardiovascular:  Denies chest pain, palpitations or edema.  Gastrointestinal: Denies  nausea, vomiting, constipation or diarrhea.  Genitourinary:  Denies dysuria, frequency, urgency or hematuria   Musculoskeletal:  Denies joint pain or swelling, difficulty walking.    Skin:  Denies rash or itching.  Neurologic:  Denies motor or sensory loss, headaches or dizziness.    Psychiatric:  Denies changes in mood or behavior.      PHYSICAL EXAM     Vital Signs  Wt Readings from Last 3 Encounters:   04/29/25 83.3 kg (183 lb 9.6 oz)   04/23/25 81.6 kg (180 lb)   02/03/25 81.9 kg (180 lb 8 oz)     Temp Readings from Last 3 Encounters:   04/29/25 97.2 °F (36.2 °C) (Temporal)   02/03/25 97.3 °F (36.3 °C) (Temporal)   08/07/23 98.4 °F (36.9 °C) (Oral)     BP Readings from Last 3 Encounters:   04/29/25 (!) 140/74   04/23/25 (!) 143/80   02/03/25 (!) 140/80     Pulse Readings from Last 3 Encounters:   04/29/25 67   04/23/25 89   02/03/25 103       Physical Exam  General:  Very pleasant male, sitting in the chair, breathing comfortable on room air  Eyes: Eyes with no icterus or injection. Vision grossly normal  Ears: Hearing grossly normal.  Nose: Nares patent  Mouth: Moist  "mucous membranes, dentition is good. No ulcerations, erythema or exudates.  Neck: Supple, no tenderness to palpation.  Cardiovascular: Regular rate and rhythm, no murmurs, no edema.    Respiratory:  Clear to auscultation bilaterally, no tachypnea or increased work of breathing.  Gastrointestinal:  Soft with active bowel sounds, no tenderness to palpation, no distention.  Genitourinary:  No suprapubic tenderness.  Musculoskeletal:  Moves all extremities with equal strength.    Skin:  Warm and dry, no obvious rashes.  No evidence of rash on palms or soles, chest and back are clear.  Neuro:   Oriented, conversant, follows commands.  Psych: Good mood, normal affect.      WOUND     N/A    VASCULAR ACCESS DEVICE     N/A    LABS AND DIAGNOSTICS       Significant Labs: I have reviewed all relevant and available labs and microbiology. .    CBC LAST 7 DAYS  No results found for: "WBC", "RBC", "HGB", "HCT", "MCV", "MCH", "MCHC", "RDW", "PLT", "MPV", "GRAN", "LYMPH", "MONO", "EOS", "BASO", "EOSINOPHIL", "BASOPHIL"      CHEMISTRY LAST 7 DAYS  CMP  Sodium   Date Value Ref Range Status   01/28/2025 139 136 - 145 mmol/L Final     Potassium   Date Value Ref Range Status   01/28/2025 4.0 3.5 - 5.1 mmol/L Final     Chloride   Date Value Ref Range Status   01/28/2025 107 95 - 110 mmol/L Final     CO2   Date Value Ref Range Status   01/28/2025 23 23 - 29 mmol/L Final     Glucose   Date Value Ref Range Status   01/28/2025 92 70 - 110 mg/dL Final     BUN   Date Value Ref Range Status   01/28/2025 18 8 - 23 mg/dL Final     Creatinine   Date Value Ref Range Status   01/28/2025 1.3 0.5 - 1.4 mg/dL Final     Calcium   Date Value Ref Range Status   01/28/2025 8.8 8.7 - 10.5 mg/dL Final     Total Protein   Date Value Ref Range Status   01/28/2025 6.7 6.0 - 8.4 g/dL Final     Albumin   Date Value Ref Range Status   01/28/2025 3.7 3.5 - 5.2 g/dL Final     Total Bilirubin   Date Value Ref Range Status   01/28/2025 0.9 0.1 - 1.0 mg/dL Final     " "Comment:     For infants and newborns, interpretation of results should be based  on gestational age, weight and in agreement with clinical  observations.    Premature Infant recommended reference ranges:  Up to 24 hours.............<8.0 mg/dL  Up to 48 hours............<12.0 mg/dL  3-5 days..................<15.0 mg/dL  6-29 days.................<15.0 mg/dL       Alkaline Phosphatase   Date Value Ref Range Status   01/28/2025 73 40 - 150 U/L Final     AST   Date Value Ref Range Status   01/28/2025 24 10 - 40 U/L Final     ALT   Date Value Ref Range Status   01/28/2025 30 10 - 44 U/L Final     Anion Gap   Date Value Ref Range Status   01/28/2025 9 8 - 16 mmol/L Final     eGFR   Date Value Ref Range Status   01/28/2025 >60.0 >60 mL/min/1.73 m^2 Final       CrCl cannot be calculated (Patient's most recent lab result is older than the maximum 7 days allowed.).    INFLAMMATORY/PROCAL  LAST 7 DAYS  @LABRCNTIP[PROCAL:7, ESR:7, CRP:7@  No results found for: "ESR"  No results found for: "CRP"    PRIOR  MICROBIOLOGY:    No results found for the last 90 days.      LAST 7 DAYS MICROBIOLOGY   Microbiology Results (last 7 days)       ** No results found for the last 168 hours. **            PATHOLOGY  Specimens (From admission, onward)      None            CURRENT/PREVIOUS VISIT EKG  No results found for this or any previous visit.    Significant Diagnostics: I have reviewed all relevant and available diagnostic results.       Latest Reference Range & Units 02/09/24 10:42 04/08/24 16:03 02/03/25 15:20   Hep B S Ab mIU/mL  -   <3.00  Non-reactive    Hepatitis C Ab Non-reactive  Non-reactive     HIV 1/2 Ag/Ab Non-reactive  Non-reactive     Chlamydia, Amplified DNA Negative    Negative   N gonorrhoeae, amplified DNA Negative    Negative        Latest Reference Range & Units 04/08/24 16:03 01/14/25 14:45 02/03/25 15:17 04/23/25 10:51   RPR  Reactive ! Reactive ! Reactive !    RPR Quantitative  1:16 ! 1:8 ! 1:16 !    Rapid Plasma " Reagin, Quant NonRea<1:1 titer    1:8 (H)   !: Data is abnormal  (H): Data is abnormally high    ASSESSMENT AND PLAN:     History of latent syphilis, initial RPR 1:16 status post 3 weekly doses of penicillin in April 20, 2024, repeat RPR 1:8, again repeated 4/23 1:8  He received treatment for latent syphilis in April 20, 2024, it might have been too soon/early to repeat RPR since there is no fourfold drop on dilutions  Latest RPR 4/23 1:8, still no 4x drop  Will start penicillin IM weekly x3 doses  Appointments for nurse visits for 2nd and 3rd penicillin shots  Prior hep B, hep C, HIV 4th Gen February and April 20, 2024 non-reactive  All questions answered  RTC 3 months with repeat RPR    Elevated PSA, follows with Urology outpatient  Will reach out to Urology since he is going to complete treatment in 3 weeks to schedule him for appointment    PMHx: HTN, HLD, gout    Latent syphilis, unspecified as early or late  -     penicillin G benzathine (BICILLIN LA) injection 2.4 Million Units    Syphilis    Primary hypertension        RTC in 3 months with repeat RPR      I spent a total of 30 minutes on the day of the visit.  This includes face to face time and non-face to face time preparing to see the patient (eg, review of tests), obtaining and/or reviewing separately obtained history, documenting clinical information in the electronic or other health record, independently interpreting results and communicating results to the patient/family/caregiver, or care coordinator.      Bertha Yoon MD  Date of Service: 04/29/2025      This note was created using [employer]  voice recognition software that occasionally misinterpreted phrases or words.

## 2025-04-30 NOTE — TELEPHONE ENCOUNTER
Spoke with patient, explained Dr. Ortez is no longer with the practice. Patient agreed to audio visit with Dr. Goyal on Monday May 26th @ 2:00 to discuss Uronav procedure.

## 2025-05-06 ENCOUNTER — OFFICE VISIT (OUTPATIENT)
Dept: INFECTIOUS DISEASES | Facility: CLINIC | Age: 65
End: 2025-05-06
Payer: COMMERCIAL

## 2025-05-06 DIAGNOSIS — A53.9 SYPHILIS: Primary | ICD-10-CM

## 2025-05-06 PROCEDURE — 4010F ACE/ARB THERAPY RXD/TAKEN: CPT | Mod: CPTII,S$GLB,, | Performed by: STUDENT IN AN ORGANIZED HEALTH CARE EDUCATION/TRAINING PROGRAM

## 2025-05-06 PROCEDURE — 99212 OFFICE O/P EST SF 10 MIN: CPT | Mod: S$GLB,,, | Performed by: STUDENT IN AN ORGANIZED HEALTH CARE EDUCATION/TRAINING PROGRAM

## 2025-05-06 NOTE — Clinical Note
Please sign orders for Bicillin La injections Thank you  SERAFIN [facility]ozano [facility]  5/06/25

## 2025-05-13 ENCOUNTER — OFFICE VISIT (OUTPATIENT)
Dept: INFECTIOUS DISEASES | Facility: CLINIC | Age: 65
End: 2025-05-13
Payer: COMMERCIAL

## 2025-05-13 DIAGNOSIS — A53.9 SYPHILIS: Primary | ICD-10-CM

## 2025-05-13 NOTE — Clinical Note
Please sign orders for last two Bicillin La injections Thank you  [facility]ERAFIN [facility] 5[facility]13/25

## 2025-05-26 ENCOUNTER — TELEPHONE (OUTPATIENT)
Dept: UROLOGY | Facility: CLINIC | Age: 65
End: 2025-05-26

## 2025-05-26 ENCOUNTER — OFFICE VISIT (OUTPATIENT)
Dept: UROLOGY | Facility: CLINIC | Age: 65
End: 2025-05-26
Payer: COMMERCIAL

## 2025-05-26 DIAGNOSIS — R97.20 ELEVATED PSA: Primary | ICD-10-CM

## 2025-05-26 PROCEDURE — 1160F RVW MEDS BY RX/DR IN RCRD: CPT | Mod: CPTII,93,, | Performed by: UROLOGY

## 2025-05-26 PROCEDURE — 1159F MED LIST DOCD IN RCRD: CPT | Mod: CPTII,93,, | Performed by: UROLOGY

## 2025-05-26 PROCEDURE — 4010F ACE/ARB THERAPY RXD/TAKEN: CPT | Mod: CPTII,93,, | Performed by: UROLOGY

## 2025-05-26 PROCEDURE — G2211 COMPLEX E/M VISIT ADD ON: HCPCS | Mod: 93,,, | Performed by: UROLOGY

## 2025-05-26 PROCEDURE — 98014 SYNCH AUDIO-ONLY EST MOD 30: CPT | Mod: 93,,, | Performed by: UROLOGY

## 2025-05-26 RX ORDER — GENTAMICIN 40 MG/ML
80 INJECTION, SOLUTION INTRAMUSCULAR; INTRAVENOUS ONCE
OUTPATIENT
Start: 2025-05-26 | End: 2025-05-26

## 2025-05-26 RX ORDER — CIPROFLOXACIN 500 MG/1
500 TABLET, FILM COATED ORAL 2 TIMES DAILY
Qty: 6 TABLET | Refills: 0 | Status: SHIPPED | OUTPATIENT
Start: 2025-05-26 | End: 2025-05-29

## 2025-05-26 NOTE — TELEPHONE ENCOUNTER
----- Message from Carolina Goyal MD sent at 5/26/2025  1:39 PM CDT -----  - Uronav prostate biopsy on 6/25/25. Cipro sent. NEEDS INSTRUCTIONS VIA PORTAL. THANKs.

## 2025-05-26 NOTE — PROGRESS NOTES
Procedure Order to Urology [[id number]]    Electronically signed by: Carolina Goyal Jr., MD on 05/26/25 1338 Status: Active   Ordering user: Carolina Goyal Jr., MD 05/26/25 1338 Authorized by: Carolina Goyal Jr., MD   Ordering mode: Standard   Frequency:  05/26/25 -   Released by: Carolina Goyal Jr., MD 05/26/25 1338   Diagnoses  Elevated PSA [R97.20]   Questionnaire    Question Answer   Procedure Prostate Biopsy   Pre-op Diagnosis Elevated prostate specific antigen (PSA)   Facility Name: [facility]   Order comments: Uronav prostate biopsy on 6/25/25.   ASC, please order local sedation, UA poct   preop IM Gentamyacin 80mg,160mg if over 300 lbs (no urine Dr goyal or Claudia)

## 2025-05-26 NOTE — PROGRESS NOTES
Audio Only Telehealth Visit     The patient location is: Home  The chief complaint leading to consultation is: Elevated PSA  Visit type: Virtual visit with audio only (telephone)  Total time spent in medical discussion with patient: 15 minutes  Total time spent on date of the encounter: 30 minutes       The reason for the audio only service rather than synchronous audio and video virtual visit was related to technical difficulties or patient preference/necessity.       Each patient to whom I provide medical services by telemedicine is:  (1) informed of the relationship between the physician and patient and the respective role of any other health care provider with respect to management of the patient; and (2) notified that they may decline to receive medical services by telemedicine and may withdraw from such care at any time. Patient verbally consented to receive this service via voice-only telephone call.       [facility] Urology New Patient/H&P:    Sandro Malone is a 64 y.o. male who presents for elevated PSA.    Patient with gout, HLD, HTN who presents for elevated PSA. Underwent evaluation with Dr. Ortez and was found to have a PSA of 4.6 in 12/2024 from 4.3 in 2/2024.     MRI prostate on 1/11/25 with non enlarged  prostate gland, with 2 peripheral zone lesions compatible with PI-RADS 4 and PI-RADS 3 lesions.  No evidence for extraprostatic disease. Overall Assessment: PI-RADS 4.     He was scheduled for Uronav biopsy with Dr. Ortez, but this was deferred as he required treatment of syphilis per ID. Has since completed treatment. No family history of prostate cancer.     No significant lower urinary tract symptoms. UA micro with 2 RBC and 2 WBC on 1/14/25.     Retired .     Denies any fever, chills, gross hematuria, flank pain, bone pain, unintentional weight loss,  trauma or history of  malignancy.         PSA  4.6  12/12/24  4.3  2/9/24    Urine culture  No  growth 1/14/25    IPSS QoL  1 1 1/14/25    PVR  0 mL  1/14/25      Past Medical History:   Diagnosis Date    Arthritis     Gout, unspecified     Hyperlipidemia     Hypertension        Past Surgical History:   Procedure Laterality Date    COLONOSCOPY N/A 03/19/2015    repeat in 10 yrs    stitches Bilateral 10/2023    elbows       No family history on file.    Review of patient's allergies indicates:  No Known Allergies    Medications Reviewed: see MAR      FOCUSED PHYSICAL EXAM:    There were no vitals filed for this visit.  There is no height or weight on file to calculate BMI.           LABS:    No results found for this or any previous visit (from the past 2 weeks).        Assessment/Diagnosis:    No diagnosis found.    Plans:    - Visit today included increased complexity associated with the care of the episodic problem addressed and managing the longitudinal care of the patient due to the serious and/or complex managed problem(s) elevated PSA. The natural history of prostate cancer and ongoing controversy regarding screening and potential treatment outcomes of prostate cancer has been discussed with the patient. The meaning of a false positive PSA and a false negative PSA has been discussed. He indicates understanding of the limitations of this screening test.   - Uronav prostate biopsy on 6/25/25. Cipro sent.          This service was not originating from a related E/M service provided within the previous 7 days nor will  to an E/M service or procedure within the next 24 hours or my soonest available appointment.  Prevailing standard of care was able to be met in this audio-only visit.

## 2025-06-23 ENCOUNTER — TELEPHONE (OUTPATIENT)
Dept: FAMILY MEDICINE | Facility: CLINIC | Age: 65
End: 2025-06-23
Payer: COMMERCIAL

## 2025-06-23 ENCOUNTER — PATIENT MESSAGE (OUTPATIENT)
Dept: FAMILY MEDICINE | Facility: CLINIC | Age: 65
End: 2025-06-23
Payer: COMMERCIAL

## 2025-06-23 NOTE — TELEPHONE ENCOUNTER
Called to reschedule patient's upcoming appointment, as Dr. Wheeler is no longer with our practice. There was no answer, so a voicemail was left requesting a call back to reschedule with another provider.

## 2025-07-03 ENCOUNTER — PATIENT MESSAGE (OUTPATIENT)
Dept: FAMILY MEDICINE | Facility: CLINIC | Age: 65
End: 2025-07-03
Payer: COMMERCIAL

## 2025-07-16 DIAGNOSIS — R97.20 ELEVATED PSA: Primary | ICD-10-CM

## 2025-07-16 RX ORDER — GENTAMICIN 40 MG/ML
80 INJECTION, SOLUTION INTRAMUSCULAR; INTRAVENOUS ONCE
OUTPATIENT
Start: 2025-07-16 | End: 2025-07-16

## 2025-07-16 RX ORDER — CIPROFLOXACIN 500 MG/1
500 TABLET, FILM COATED ORAL 2 TIMES DAILY
Qty: 6 TABLET | Refills: 0 | Status: SHIPPED | OUTPATIENT
Start: 2025-07-16 | End: 2025-07-19

## 2025-07-16 NOTE — PROGRESS NOTES
Procedure Order to Urology [[id number]]    Electronically signed by: Carolina Goyal Jr., MD on 07/16/25 0954 Status: Active   Ordering user: Carolina Goyal Jr., MD 07/16/25 0954 Authorized by: Carolina Goyal Jr., MD   Ordering mode: Standard   Frequency:  07/16/25 -     Diagnoses  Elevated PSA [R97.20]   Questionnaire    Question Answer   Procedure Prostate Biopsy   Pre-op Diagnosis Elevated prostate specific antigen (PSA)   Facility Name: [facility]   Order comments: Uronav biopsy on 8/13/25.   ASC, please order local sedation, UA poct   preop IM Gentamyacin 80mg,160mg if over 300 lbs (no urine Dr goyal or Claudia)

## 2025-07-25 PROCEDURE — 86593 SYPHILIS TEST NON-TREP QUANT: CPT | Mod: 59 | Performed by: STUDENT IN AN ORGANIZED HEALTH CARE EDUCATION/TRAINING PROGRAM

## 2025-07-25 PROCEDURE — 86592 SYPHILIS TEST NON-TREP QUAL: CPT | Mod: 59 | Performed by: STUDENT IN AN ORGANIZED HEALTH CARE EDUCATION/TRAINING PROGRAM

## 2025-07-25 PROCEDURE — 86593 SYPHILIS TEST NON-TREP QUANT: CPT | Performed by: STUDENT IN AN ORGANIZED HEALTH CARE EDUCATION/TRAINING PROGRAM

## 2025-07-30 ENCOUNTER — OFFICE VISIT (OUTPATIENT)
Dept: INFECTIOUS DISEASES | Facility: CLINIC | Age: 65
End: 2025-07-30
Payer: COMMERCIAL

## 2025-07-30 VITALS
SYSTOLIC BLOOD PRESSURE: 136 MMHG | TEMPERATURE: 97 F | HEIGHT: 70 IN | OXYGEN SATURATION: 97 % | BODY MASS INDEX: 25.8 KG/M2 | HEART RATE: 64 BPM | WEIGHT: 180.19 LBS | DIASTOLIC BLOOD PRESSURE: 68 MMHG

## 2025-07-30 DIAGNOSIS — I10 PRIMARY HYPERTENSION: ICD-10-CM

## 2025-07-30 DIAGNOSIS — A53.9 SYPHILIS: Primary | ICD-10-CM

## 2025-07-30 DIAGNOSIS — R97.20 ELEVATED PSA: ICD-10-CM

## 2025-07-30 DIAGNOSIS — A53.0 LATENT SYPHILIS, UNSPECIFIED AS EARLY OR LATE: ICD-10-CM

## 2025-07-30 PROCEDURE — 1159F MED LIST DOCD IN RCRD: CPT | Mod: CPTII,S$GLB,, | Performed by: STUDENT IN AN ORGANIZED HEALTH CARE EDUCATION/TRAINING PROGRAM

## 2025-07-30 PROCEDURE — 3075F SYST BP GE 130 - 139MM HG: CPT | Mod: CPTII,S$GLB,, | Performed by: STUDENT IN AN ORGANIZED HEALTH CARE EDUCATION/TRAINING PROGRAM

## 2025-07-30 PROCEDURE — 99214 OFFICE O/P EST MOD 30 MIN: CPT | Mod: S$GLB,,, | Performed by: STUDENT IN AN ORGANIZED HEALTH CARE EDUCATION/TRAINING PROGRAM

## 2025-07-30 PROCEDURE — 3078F DIAST BP <80 MM HG: CPT | Mod: CPTII,S$GLB,, | Performed by: STUDENT IN AN ORGANIZED HEALTH CARE EDUCATION/TRAINING PROGRAM

## 2025-07-30 PROCEDURE — 4010F ACE/ARB THERAPY RXD/TAKEN: CPT | Mod: CPTII,S$GLB,, | Performed by: STUDENT IN AN ORGANIZED HEALTH CARE EDUCATION/TRAINING PROGRAM

## 2025-07-30 PROCEDURE — 99999 PR PBB SHADOW E&M-EST. PATIENT-LVL III: CPT | Mod: PBBFAC,,, | Performed by: STUDENT IN AN ORGANIZED HEALTH CARE EDUCATION/TRAINING PROGRAM

## 2025-07-30 PROCEDURE — 3008F BODY MASS INDEX DOCD: CPT | Mod: CPTII,S$GLB,, | Performed by: STUDENT IN AN ORGANIZED HEALTH CARE EDUCATION/TRAINING PROGRAM

## 2025-07-30 RX ORDER — DOXYCYCLINE 100 MG/1
100 CAPSULE ORAL 2 TIMES DAILY
Qty: 42 CAPSULE | Refills: 0 | Status: SHIPPED | OUTPATIENT
Start: 2025-07-30 | End: 2025-08-20

## 2025-07-30 NOTE — PROGRESS NOTES
[facility] - Infectious Diseases   Department of Infectious Disease  Office Visit Note        PATIENT NAME: Sandro Malone  YOB: 1960   MRN: [id number]  DATE OF VISIT: 07/30/2025    REASON FOR VISIT: Follow-up        HISTORY OF PRESENT ILLNESS     Sandro Malone is a 65 y.o. male very pleasant, with past medical history of HTN, HLD, gout who was referred by Urology Clinic for positive syphilis test.  Patient mentions he had a sexual encounter back in January of 2024 in [place] and did not know status of partner.  He tested positive 1:16 dilution, he was contacted by the Department of Health in [place] and received 3 doses of penicillin weekly for latent syphilis in April 2024.  Patient has been followed by Urology for elevated PSA, has plans for prostate biopsy, repeat RPR 1:8 dilutions.  He was referred for further evaluation.    He denies any fever or chills, no night sweats, no cough or shortness of breath, no chest pain, no nausea or vomiting, no abdominal pain, no dysuria increased urinary frequency, no change in bowel movements.  Of note, he had not noted any type of rash on his back or chest, palms or soles or any other area including genital area.    Outdoor activities:  Nonsmoker, occasional alcohol, no drugs.  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  Travel:  [facility] January 20, 2024.  Lives in [facility].  Implants:  None  Antibiotic history:  Penicillin weekly shots x3 in April 20, 2024    Social History  Marital Status: Single - sexually active, last encounter February 20, 2024  Alcohol History:  reports current alcohol use.  Tobacco History:  reports that he has never smoked. He has never used smokeless tobacco.  Drug History:  reports no history of drug use.      INTERVAL HISTORY     7/30:  Interim reviewed, patient is here for follow-up.  Patient had RPR done earlier this week, remains positive 1:16 a.m..  Patient completed 2 rounds  of penicillin IM x3 weekly in [place] and our office.  Uncertain/unclear why dilutions having decreased?  He mentions he has been abstinent since diagnosis in February.  Overall, he mentions he is feeling good, denies any headache, no neck pain, no vision changes or hearing issues, no nausea or vomiting, no chest pain, no cough or shortness of breath, no abdominal pain, no dysuria increased urinary frequency although he does mentioned he drinks a lot of sweet tea and has nocturia, no changes in bowel movements.  Of note, he saw Urology/Dr. Goyal, plan for cystoscopy and prostate biopsy August 13th, the patient will like to reschedule since he is also planning on going on a trip on his motorcycle and we will coordinate with Urology office for procedure.  We discussed and he will have repeat serology scheduled by the end of October week of the 27th and he will come back to the office on October 30th.    4/29:  Patient is here for follow-up, repeat RPR from 4/23 still positive 1:8.  He denies having any skin lesions, no oral ulcers.  Overall, he feels good.  He canceled the appointment with Urology review of needing penicillin treatment.  We will start penicillin series today weekly x3 doses.  We will contact Urology to have him reschedule appointment for procedure after he completes treatment.    4/23:  Patient is here for follow-up.  Since last visit, unfortunately the RPR that was supposed to be done for this appointment was drawn in February and was still positive one calling 16.  Patient will have blood work repeated today and we will do RPR quantitative with FTA antibodies IgM and IgG.  If we have lab work consistent with treatment of syphilis we will be able to give him an okay to have his urology procedure.  He mentions he does not have any constitutional symptoms, no fever chills, no night sweats, no cough or shortness of breath, no nausea or vomiting, no chest pain, no abdominal pain, no dysuria increased  urinary frequency, no changes in bowel movements.  Of note, no new sexual partners, he mentions he has not been sexually active since he tested positive for syphilis in January.    2/3/24: Referred by Urology office for positive RPR    ALLERGIES AND CURRENT MEDICATIONS     Review of patient's allergies indicates:  No Known Allergies    Current Outpatient Medications   Medication Sig Dispense Refill    albuterol (PROVENTIL/VENTOLIN HFA) 90 mcg/actuation inhaler Inhale 2 puffs every 4-6 hours by inhalation route as needed for 30 days.      amLODIPine (NORVASC) 5 MG tablet Take 5 mg by mouth.      atorvastatin (LIPITOR) 20 MG tablet TAKE 1 TABLET(20 MG) BY MOUTH EVERY EVENING 90 tablet 3    benazepriL (LOTENSIN) 20 MG tablet TAKE 1 TABLET(20 MG) BY MOUTH DAILY 90 tablet 3    doxycycline (VIBRAMYCIN) 100 MG Cap Take 1 capsule (100 mg total) by mouth 2 (two) times daily. for 21 days 42 capsule 0    ergocalciferol (ERGOCALCIFEROL) 50,000 unit Cap Take 1 capsule by mouth every 7 days. (Patient not taking: Reported on 7/30/2025)      meloxicam (MOBIC) 15 MG tablet TAKE 1 TABLET BY MOUTH AS NEEDED FOR PAIN (Patient not taking: Reported on 7/30/2025) 30 tablet 2     Current Facility-Administered Medications   Medication Dose Route Frequency Provider Last Rate Last Admin    gentamicin injection 80 mg  80 mg Intramuscular Q12H Mariano Ortez MD           MEDICAL/SURGICAL/FAMILY HISTORY     Past Surgical History:   Procedure Laterality Date    COLONOSCOPY N/A 03/19/2015    repeat in 10 yrs    stitches Bilateral 10/2023    elbows     Past Medical History:   Diagnosis Date    Arthritis     Gout, unspecified     Hyperlipidemia     Hypertension      No family history on file.     REVIEW OF SYSTEMS     Review of Systems  Constitutional:  Denies fevers, chills, night sweats, loss of appetite.  HEENT: Denies visual changes,ear pain, sinus congestion, mouth pain or trouble swallowing, sore throat or  dental pain.  Neck: Denies neck  pain or lumps.  Respiratory: Denies shortness of breath, coughing, wheezing or hemoptysis.  Cardiovascular:  Denies chest pain, palpitations or edema.  Gastrointestinal: Denies  nausea, vomiting, constipation or diarrhea.  Genitourinary:  Denies dysuria, frequency, urgency or hematuria   Musculoskeletal:  Denies joint pain or swelling, difficulty walking.    Skin:  Denies rash or itching.  Neurologic:  Denies motor or sensory loss, headaches or dizziness.    Psychiatric:  Denies changes in mood or behavior.      PHYSICAL EXAM     Vital Signs  Wt Readings from Last 3 Encounters:   07/30/25 81.7 kg (180 lb 3.2 oz)   04/29/25 83.3 kg (183 lb 9.6 oz)   04/23/25 81.6 kg (180 lb)     Temp Readings from Last 3 Encounters:   07/30/25 97.4 °F (36.3 °C) (Temporal)   04/29/25 97.2 °F (36.2 °C) (Temporal)   02/03/25 97.3 °F (36.3 °C) (Temporal)     BP Readings from Last 3 Encounters:   07/30/25 136/68   04/29/25 (!) 140/74   04/23/25 (!) 143/80     Pulse Readings from Last 3 Encounters:   07/30/25 64   04/29/25 67   04/23/25 89       Physical Exam  General:  Very pleasant male, sitting in the chair, breathing comfortable on room air  Eyes: Eyes with no icterus or injection. Vision grossly normal  Ears: Hearing grossly normal.  Nose: Nares patent  Mouth: Moist mucous membranes, dentition is good. No ulcerations, erythema or exudates.  Neck: Supple, no tenderness to palpation.  Cardiovascular: Regular rate and rhythm, no murmurs, no edema.    Respiratory:  Clear to auscultation bilaterally, no tachypnea or increased work of breathing.  Gastrointestinal:  Soft with active bowel sounds, no tenderness to palpation, no distention.  Genitourinary:  No suprapubic tenderness.  Musculoskeletal:  Moves all extremities with equal strength.    Skin:  Warm and dry, no obvious rashes.  No evidence of rash on palms or soles, chest and back are clear.  Neuro:   Oriented, conversant, follows commands.  Psych: Good mood, normal affect.      WOUND  "    N/A    VASCULAR ACCESS DEVICE     N/A    LABS AND DIAGNOSTICS       Significant Labs: I have reviewed all relevant and available labs and microbiology. .    CBC LAST 7 DAYS  No results found for: "WBC", "RBC", "HGB", "HCT", "MCV", "MCH", "MCHC", "RDW", "PLT", "MPV", "GRAN", "LYMPH", "MONO", "EOS", "BASO", "EOSINOPHIL", "BASOPHIL"      CHEMISTRY LAST 7 DAYS  CMP  Sodium   Date Value Ref Range Status   01/28/2025 139 136 - 145 mmol/L Final     Potassium   Date Value Ref Range Status   01/28/2025 4.0 3.5 - 5.1 mmol/L Final     Chloride   Date Value Ref Range Status   01/28/2025 107 95 - 110 mmol/L Final     CO2   Date Value Ref Range Status   01/28/2025 23 23 - 29 mmol/L Final     Glucose   Date Value Ref Range Status   01/28/2025 92 70 - 110 mg/dL Final     BUN   Date Value Ref Range Status   01/28/2025 18 8 - 23 mg/dL Final     Creatinine   Date Value Ref Range Status   01/28/2025 1.3 0.5 - 1.4 mg/dL Final     Calcium   Date Value Ref Range Status   01/28/2025 8.8 8.7 - 10.5 mg/dL Final     Total Protein   Date Value Ref Range Status   01/28/2025 6.7 6.0 - 8.4 g/dL Final     Albumin   Date Value Ref Range Status   01/28/2025 3.7 3.5 - 5.2 g/dL Final     Total Bilirubin   Date Value Ref Range Status   01/28/2025 0.9 0.1 - 1.0 mg/dL Final     Comment:     For infants and newborns, interpretation of results should be based  on gestational age, weight and in agreement with clinical  observations.    Premature Infant recommended reference ranges:  Up to 24 hours.............<8.0 mg/dL  Up to 48 hours............<12.0 mg/dL  3-5 days..................<15.0 mg/dL  6-29 days.................<15.0 mg/dL       Alkaline Phosphatase   Date Value Ref Range Status   01/28/2025 73 40 - 150 U/L Final     AST   Date Value Ref Range Status   01/28/2025 24 10 - 40 U/L Final     ALT   Date Value Ref Range Status   01/28/2025 30 10 - 44 U/L Final     Anion Gap   Date Value Ref Range Status   01/28/2025 9 8 - 16 mmol/L Final     eGFR " "  Date Value Ref Range Status   01/28/2025 >60.0 >60 mL/min/1.73 m^2 Final       CrCl cannot be calculated (Patient's most recent lab result is older than the maximum 7 days allowed.).    INFLAMMATORY/PROCAL  LAST 7 DAYS  @LABRCNTIP[PROCAL:7, ESR:7, CRP:7@  No results found for: "ESR"  No results found for: "CRP"    PRIOR  MICROBIOLOGY:    No results found for the last 90 days.      LAST 7 DAYS MICROBIOLOGY   Microbiology Results (last 7 days)       ** No results found for the last 168 hours. **            PATHOLOGY  Specimens (From admission, onward)      None            CURRENT/PREVIOUS VISIT EKG  No results found for this or any previous visit.    Significant Diagnostics: I have reviewed all relevant and available diagnostic results.       Latest Reference Range & Units 02/09/24 10:42 04/08/24 16:03 02/03/25 15:20   Hep B S Ab mIU/mL  -   <3.00  Non-reactive    Hepatitis C Ab Non-reactive  Non-reactive     HIV 1/2 Ag/Ab Non-reactive  Non-reactive     Chlamydia, Amplified DNA Negative    Negative   N gonorrhoeae, amplified DNA Negative    Negative        Latest Reference Range & Units 04/08/24 16:03 01/14/25 14:45 02/03/25 15:17 02/03/25 15:20 07/25/25 10:39   Chlamydia, Amplified DNA Negative     Negative    N gonorrhoeae, amplified DNA Negative     Negative    RPR Non-Reactive, Equivocal, Weakly Reactive  Reactive ! Reactive ! Reactive !  Reactive !   RPR Quantitative -  1:16 ! 1:8 ! 1:16 !  1:16 !   Treponema Pallidum Antibodies (IgG, IgM) Non-Reactive      Reactive !   !: Data is abnormal    ASSESSMENT AND PLAN:     History of latent syphilis, initial RPR 1:16 status post 3 weekly doses of penicillin in April 20, 2024, repeat RPR 1:8, again repeated 4/23 1:8 and again 7/25 1:16  Patient is status post 2 courses of penicillin IM for latent syphilis each of 3 doses weekly; in [place] and at the office, RPR fails to improve   Discussed with patient, will start doxycycline 100 mg p.o. capsules twice a day for " 21 days  Prior hep B, hep C, HIV 4th Gen February and April 20, 2024 non-reactive  All questions answered  RTC 3 months with repeat RPR    Elevated PSA, follows with Urology outpatient  Plan for cystoscopy next month    PMHx: HTN, HLD, gout        Syphilis  -     doxycycline (VIBRAMYCIN) 100 MG Cap; Take 1 capsule (100 mg total) by mouth 2 (two) times daily. for 21 days  Dispense: 42 capsule; Refill: 0  -     RPR (for monitoring); Future; Expected date: 10/27/2025    Latent syphilis, unspecified as early or late  -     doxycycline (VIBRAMYCIN) 100 MG Cap; Take 1 capsule (100 mg total) by mouth 2 (two) times daily. for 21 days  Dispense: 42 capsule; Refill: 0  -     RPR (for monitoring); Future; Expected date: 10/27/2025    Primary hypertension    Elevated PSA    Doxycycline 100mg PO twice a day for 21 days  RPR qualitative to me done 10/27  RTC in 3 months     Follow up in about 3 months (around 10/30/2025).      I spent a total of 30 minutes on the day of the visit.  This includes face to face time and non-face to face time preparing to see the patient (eg, review of tests), obtaining and/or reviewing separately obtained history, documenting clinical information in the electronic or other health record, independently interpreting results and communicating results to the patient/family/caregiver, or care coordinator.       Bertha Yoon MD  Date of Service: 07/30/2025      This note was created using [employer]  voice recognition software that occasionally misinterpreted phrases or words.  This note was generated with the assistance of ambient listening technology. Verbal consent was obtained by the patient and accompanying visitor(s) for the recording of patient appointment to facilitate this note. I attest to having reviewed and edited the generated note for accuracy, though some syntax or spelling errors may persist. Please contact the author of this note for any clarification.

## 2025-08-13 ENCOUNTER — OFFICE VISIT (OUTPATIENT)
Dept: FAMILY MEDICINE | Facility: CLINIC | Age: 65
End: 2025-08-13
Payer: COMMERCIAL

## 2025-08-13 ENCOUNTER — HOSPITAL ENCOUNTER (OUTPATIENT)
Facility: HOSPITAL | Age: 65
Discharge: HOME | End: 2025-08-13
Attending: UROLOGY | Admitting: UROLOGY
Payer: COMMERCIAL

## 2025-08-13 VITALS
HEART RATE: 77 BPM | SYSTOLIC BLOOD PRESSURE: 130 MMHG | BODY MASS INDEX: 25.17 KG/M2 | HEIGHT: 70 IN | WEIGHT: 175.81 LBS | DIASTOLIC BLOOD PRESSURE: 78 MMHG | RESPIRATION RATE: 18 BRPM | OXYGEN SATURATION: 97 %

## 2025-08-13 DIAGNOSIS — R97.20 ELEVATED PSA: Primary | ICD-10-CM

## 2025-08-13 DIAGNOSIS — Z12.11 COLON CANCER SCREENING: ICD-10-CM

## 2025-08-13 DIAGNOSIS — E78.2 MIXED HYPERLIPIDEMIA: ICD-10-CM

## 2025-08-13 DIAGNOSIS — R97.20 ELEVATED PSA: ICD-10-CM

## 2025-08-13 DIAGNOSIS — I10 PRIMARY HYPERTENSION: ICD-10-CM

## 2025-08-13 DIAGNOSIS — M79.604 RIGHT LEG PAIN: ICD-10-CM

## 2025-08-13 DIAGNOSIS — M54.6 CHRONIC THORACIC BACK PAIN, UNSPECIFIED BACK PAIN LATERALITY: ICD-10-CM

## 2025-08-13 DIAGNOSIS — G89.29 CHRONIC THORACIC BACK PAIN, UNSPECIFIED BACK PAIN LATERALITY: ICD-10-CM

## 2025-08-13 DIAGNOSIS — Z76.89 ENCOUNTER TO ESTABLISH CARE: Primary | ICD-10-CM

## 2025-08-13 PROCEDURE — 55700 PR BIOPSY OF PROSTATE,NEEDLE/PUNCH: CPT | Mod: ,,, | Performed by: UROLOGY

## 2025-08-13 PROCEDURE — 4010F ACE/ARB THERAPY RXD/TAKEN: CPT | Mod: CPTII,S$GLB,, | Performed by: STUDENT IN AN ORGANIZED HEALTH CARE EDUCATION/TRAINING PROGRAM

## 2025-08-13 PROCEDURE — 76872 US TRANSRECTAL: CPT | Performed by: UROLOGY

## 2025-08-13 PROCEDURE — 3078F DIAST BP <80 MM HG: CPT | Mod: CPTII,S$GLB,, | Performed by: STUDENT IN AN ORGANIZED HEALTH CARE EDUCATION/TRAINING PROGRAM

## 2025-08-13 PROCEDURE — 3075F SYST BP GE 130 - 139MM HG: CPT | Mod: CPTII,S$GLB,, | Performed by: STUDENT IN AN ORGANIZED HEALTH CARE EDUCATION/TRAINING PROGRAM

## 2025-08-13 PROCEDURE — G2211 COMPLEX E/M VISIT ADD ON: HCPCS | Mod: S$GLB,,, | Performed by: STUDENT IN AN ORGANIZED HEALTH CARE EDUCATION/TRAINING PROGRAM

## 2025-08-13 PROCEDURE — 3008F BODY MASS INDEX DOCD: CPT | Mod: CPTII,S$GLB,, | Performed by: STUDENT IN AN ORGANIZED HEALTH CARE EDUCATION/TRAINING PROGRAM

## 2025-08-13 PROCEDURE — 76872 US TRANSRECTAL: CPT | Mod: 26,,, | Performed by: UROLOGY

## 2025-08-13 PROCEDURE — 1159F MED LIST DOCD IN RCRD: CPT | Mod: CPTII,S$GLB,, | Performed by: STUDENT IN AN ORGANIZED HEALTH CARE EDUCATION/TRAINING PROGRAM

## 2025-08-13 PROCEDURE — 99214 OFFICE O/P EST MOD 30 MIN: CPT | Mod: S$GLB,,, | Performed by: STUDENT IN AN ORGANIZED HEALTH CARE EDUCATION/TRAINING PROGRAM

## 2025-08-13 PROCEDURE — 63600175 PHARM REV CODE 636 W HCPCS: Performed by: UROLOGY

## 2025-08-13 PROCEDURE — 55700 HC PROSTATE NEEDLE BIOPSY: CPT | Performed by: UROLOGY

## 2025-08-13 RX ORDER — MELOXICAM 15 MG/1
15 TABLET ORAL DAILY
Qty: 90 TABLET | Refills: 2 | Status: SHIPPED | OUTPATIENT
Start: 2025-08-13

## 2025-08-13 RX ORDER — GENTAMICIN 40 MG/ML
80 INJECTION, SOLUTION INTRAMUSCULAR; INTRAVENOUS ONCE
Status: COMPLETED | OUTPATIENT
Start: 2025-08-13 | End: 2025-08-13

## 2025-08-13 RX ORDER — CIPROFLOXACIN 500 MG/1
500 TABLET, FILM COATED ORAL
COMMUNITY

## 2025-08-13 RX ORDER — LIDOCAINE HYDROCHLORIDE 20 MG/ML
INJECTION, SOLUTION EPIDURAL; INFILTRATION; INTRACAUDAL; PERINEURAL
Status: DISCONTINUED | OUTPATIENT
Start: 2025-08-13 | End: 2025-08-13 | Stop reason: HOSPADM

## 2025-08-13 RX ADMIN — GENTAMICIN SULFATE 80 MG: 40 INJECTION, SOLUTION INTRAMUSCULAR; INTRAVENOUS at 02:08

## 2025-08-14 VITALS
TEMPERATURE: 98 F | OXYGEN SATURATION: 96 % | HEART RATE: 76 BPM | HEIGHT: 70 IN | RESPIRATION RATE: 20 BRPM | SYSTOLIC BLOOD PRESSURE: 170 MMHG | WEIGHT: 180.13 LBS | BODY MASS INDEX: 25.79 KG/M2 | DIASTOLIC BLOOD PRESSURE: 93 MMHG

## 2025-08-19 DIAGNOSIS — M25.551 RIGHT HIP PAIN: Primary | ICD-10-CM

## 2025-08-19 DIAGNOSIS — M79.604 RIGHT LEG PAIN: ICD-10-CM

## 2025-08-21 ENCOUNTER — OFFICE VISIT (OUTPATIENT)
Dept: ORTHOPEDICS | Facility: CLINIC | Age: 65
End: 2025-08-21
Payer: COMMERCIAL

## 2025-08-21 ENCOUNTER — HOSPITAL ENCOUNTER (OUTPATIENT)
Dept: RADIOLOGY | Facility: HOSPITAL | Age: 65
Discharge: HOME | End: 2025-08-21
Attending: ORTHOPAEDIC SURGERY
Payer: COMMERCIAL

## 2025-08-21 VITALS — BODY MASS INDEX: 25.05 KG/M2 | WEIGHT: 175 LBS | HEIGHT: 70 IN

## 2025-08-21 DIAGNOSIS — M25.551 RIGHT HIP PAIN: ICD-10-CM

## 2025-08-21 DIAGNOSIS — M79.604 RIGHT LEG PAIN: ICD-10-CM

## 2025-08-21 DIAGNOSIS — M25.551 RIGHT HIP PAIN: Primary | ICD-10-CM

## 2025-08-21 PROCEDURE — 99999 PR PBB SHADOW E&M-EST. PATIENT-LVL III: CPT | Mod: PBBFAC,,, | Performed by: ORTHOPAEDIC SURGERY

## 2025-08-21 PROCEDURE — 73502 X-RAY EXAM HIP UNI 2-3 VIEWS: CPT | Mod: TC,PN,RT

## 2025-08-21 PROCEDURE — 1159F MED LIST DOCD IN RCRD: CPT | Mod: CPTII,S$GLB,, | Performed by: ORTHOPAEDIC SURGERY

## 2025-08-21 PROCEDURE — 73502 X-RAY EXAM HIP UNI 2-3 VIEWS: CPT | Mod: 26,RT,, | Performed by: RADIOLOGY

## 2025-08-21 PROCEDURE — 4010F ACE/ARB THERAPY RXD/TAKEN: CPT | Mod: CPTII,S$GLB,, | Performed by: ORTHOPAEDIC SURGERY

## 2025-08-21 PROCEDURE — 1160F RVW MEDS BY RX/DR IN RCRD: CPT | Mod: CPTII,S$GLB,, | Performed by: ORTHOPAEDIC SURGERY

## 2025-08-21 PROCEDURE — 99204 OFFICE O/P NEW MOD 45 MIN: CPT | Mod: S$GLB,,, | Performed by: ORTHOPAEDIC SURGERY

## 2025-08-21 PROCEDURE — 3008F BODY MASS INDEX DOCD: CPT | Mod: CPTII,S$GLB,, | Performed by: ORTHOPAEDIC SURGERY

## 2025-08-21 RX ORDER — CELECOXIB 100 MG/1
100 CAPSULE ORAL 2 TIMES DAILY
Qty: 60 CAPSULE | Refills: 1 | Status: SHIPPED | OUTPATIENT
Start: 2025-08-21

## 2025-08-22 ENCOUNTER — CLINICAL SUPPORT (OUTPATIENT)
Dept: REHABILITATION | Facility: HOSPITAL | Age: 65
End: 2025-08-22
Payer: COMMERCIAL

## 2025-08-22 DIAGNOSIS — R20.0 RIGHT SIDED NUMBNESS: ICD-10-CM

## 2025-08-22 DIAGNOSIS — M54.41 CHRONIC BILATERAL LOW BACK PAIN WITH RIGHT-SIDED SCIATICA: Primary | ICD-10-CM

## 2025-08-22 DIAGNOSIS — G89.29 CHRONIC BILATERAL LOW BACK PAIN WITH RIGHT-SIDED SCIATICA: Primary | ICD-10-CM

## 2025-08-22 PROCEDURE — 97161 PT EVAL LOW COMPLEX 20 MIN: CPT | Mod: PN

## 2025-08-22 PROCEDURE — 97530 THERAPEUTIC ACTIVITIES: CPT | Mod: PN

## 2025-08-28 ENCOUNTER — CLINICAL SUPPORT (OUTPATIENT)
Dept: REHABILITATION | Facility: HOSPITAL | Age: 65
End: 2025-08-28
Payer: COMMERCIAL

## 2025-08-28 DIAGNOSIS — G89.29 CHRONIC BILATERAL LOW BACK PAIN WITH RIGHT-SIDED SCIATICA: Primary | ICD-10-CM

## 2025-08-28 DIAGNOSIS — R20.0 RIGHT SIDED NUMBNESS: ICD-10-CM

## 2025-08-28 DIAGNOSIS — M54.41 CHRONIC BILATERAL LOW BACK PAIN WITH RIGHT-SIDED SCIATICA: Primary | ICD-10-CM

## 2025-08-28 PROCEDURE — 97530 THERAPEUTIC ACTIVITIES: CPT | Mod: PN

## 2025-08-28 PROCEDURE — 97112 NEUROMUSCULAR REEDUCATION: CPT | Mod: PN

## 2025-09-03 ENCOUNTER — CLINICAL SUPPORT (OUTPATIENT)
Dept: REHABILITATION | Facility: HOSPITAL | Age: 65
End: 2025-09-03
Payer: COMMERCIAL

## 2025-09-03 DIAGNOSIS — R20.0 RIGHT SIDED NUMBNESS: ICD-10-CM

## 2025-09-03 DIAGNOSIS — M54.41 CHRONIC BILATERAL LOW BACK PAIN WITH RIGHT-SIDED SCIATICA: Primary | ICD-10-CM

## 2025-09-03 DIAGNOSIS — G89.29 CHRONIC BILATERAL LOW BACK PAIN WITH RIGHT-SIDED SCIATICA: Primary | ICD-10-CM

## 2025-09-03 PROCEDURE — 97112 NEUROMUSCULAR REEDUCATION: CPT | Mod: PN

## 2025-09-03 PROCEDURE — 97530 THERAPEUTIC ACTIVITIES: CPT | Mod: PN

## 2025-09-05 ENCOUNTER — OFFICE VISIT (OUTPATIENT)
Dept: UROLOGY | Facility: CLINIC | Age: 65
End: 2025-09-05
Payer: COMMERCIAL

## 2025-09-05 DIAGNOSIS — C61 PROSTATE CANCER: Primary | ICD-10-CM

## 2025-09-05 PROCEDURE — 99999 PR PBB SHADOW E&M-EST. PATIENT-LVL III: CPT | Mod: PBBFAC,,, | Performed by: UROLOGY

## (undated) DEVICE — GUN BIOPSY 18GA MONOPLY

## (undated) DEVICE — GUIDE BIOPSY BIPLANAR 18G

## (undated) DEVICE — COVER PROBE 3D/4D

## (undated) DEVICE — COVER TRANSDUCER LATEX N/STERI